# Patient Record
Sex: FEMALE | Race: WHITE | NOT HISPANIC OR LATINO
[De-identification: names, ages, dates, MRNs, and addresses within clinical notes are randomized per-mention and may not be internally consistent; named-entity substitution may affect disease eponyms.]

---

## 2017-01-03 ENCOUNTER — APPOINTMENT (OUTPATIENT)
Dept: HEART AND VASCULAR | Facility: CLINIC | Age: 67
End: 2017-01-03

## 2017-01-04 ENCOUNTER — APPOINTMENT (OUTPATIENT)
Dept: ORTHOPEDIC SURGERY | Facility: CLINIC | Age: 67
End: 2017-01-04

## 2017-01-06 ENCOUNTER — APPOINTMENT (OUTPATIENT)
Dept: ORTHOPEDIC SURGERY | Facility: CLINIC | Age: 67
End: 2017-01-06

## 2017-01-12 ENCOUNTER — APPOINTMENT (OUTPATIENT)
Dept: ORTHOPEDIC SURGERY | Facility: CLINIC | Age: 67
End: 2017-01-12

## 2017-01-17 ENCOUNTER — RX RENEWAL (OUTPATIENT)
Age: 67
End: 2017-01-17

## 2017-01-24 ENCOUNTER — APPOINTMENT (OUTPATIENT)
Dept: ORTHOPEDIC SURGERY | Facility: CLINIC | Age: 67
End: 2017-01-24

## 2017-01-24 DIAGNOSIS — L02.511 CUTANEOUS ABSCESS OF RIGHT HAND: ICD-10-CM
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
## 2017-01-24 DIAGNOSIS — S61.259D OPEN BITE OF UNSPECIFIED FINGER W/OUT DAMAGE TO NAIL, SUBSEQUENT ENCOUNTER: ICD-10-CM

## 2017-01-24 LAB
INR PPP: 3.78
PT BLD: 39.8

## 2017-01-27 ENCOUNTER — APPOINTMENT (OUTPATIENT)
Dept: ORTHOPEDIC SURGERY | Facility: CLINIC | Age: 67
End: 2017-01-27

## 2017-02-03 ENCOUNTER — APPOINTMENT (OUTPATIENT)
Dept: ORTHOPEDIC SURGERY | Facility: CLINIC | Age: 67
End: 2017-02-03

## 2017-02-10 ENCOUNTER — APPOINTMENT (OUTPATIENT)
Dept: ORTHOPEDIC SURGERY | Facility: CLINIC | Age: 67
End: 2017-02-10

## 2017-02-10 VITALS — HEIGHT: 65 IN | RESPIRATION RATE: 16 BRPM | WEIGHT: 150 LBS | BODY MASS INDEX: 24.99 KG/M2

## 2017-02-10 DIAGNOSIS — L98.0 PYOGENIC GRANULOMA: ICD-10-CM

## 2017-02-21 ENCOUNTER — APPOINTMENT (OUTPATIENT)
Dept: HEART AND VASCULAR | Facility: CLINIC | Age: 67
End: 2017-02-21

## 2017-02-21 ENCOUNTER — LABORATORY RESULT (OUTPATIENT)
Age: 67
End: 2017-02-21

## 2017-02-21 VITALS
BODY MASS INDEX: 23.49 KG/M2 | OXYGEN SATURATION: 98 % | TEMPERATURE: 98.2 F | HEIGHT: 68 IN | WEIGHT: 155 LBS | SYSTOLIC BLOOD PRESSURE: 148 MMHG | HEART RATE: 62 BPM | DIASTOLIC BLOOD PRESSURE: 80 MMHG

## 2017-02-22 ENCOUNTER — CLINICAL ADVICE (OUTPATIENT)
Age: 67
End: 2017-02-22

## 2017-02-22 LAB
ALBUMIN SERPL ELPH-MCNC: 3.7 G/DL
ALP BLD-CCNC: 75 U/L
ALT SERPL-CCNC: 8 U/L
ANION GAP SERPL CALC-SCNC: 17 MMOL/L
APPEARANCE: CLEAR
APTT BLD: 58.4 SEC
AST SERPL-CCNC: 12 U/L
BASOPHILS # BLD AUTO: 0.05 K/UL
BASOPHILS NFR BLD AUTO: 0.9 %
BILIRUB SERPL-MCNC: 0.3 MG/DL
BILIRUBIN URINE: NEGATIVE
BLOOD URINE: ABNORMAL
BUN SERPL-MCNC: 15 MG/DL
CALCIUM SERPL-MCNC: 9.7 MG/DL
CHLORIDE SERPL-SCNC: 104 MMOL/L
CHOLEST SERPL-MCNC: 177 MG/DL
CHOLEST/HDLC SERPL: 3.4 RATIO
CO2 SERPL-SCNC: 23 MMOL/L
COLOR: YELLOW
CREAT SERPL-MCNC: 0.87 MG/DL
EOSINOPHIL # BLD AUTO: 0.23 K/UL
EOSINOPHIL NFR BLD AUTO: 3.9 %
GLUCOSE QUALITATIVE U: NORMAL MG/DL
GLUCOSE SERPL-MCNC: 87 MG/DL
HBA1C MFR BLD HPLC: 5.4 %
HCT VFR BLD CALC: 37.3 %
HDLC SERPL-MCNC: 52 MG/DL
HGB BLD-MCNC: 11.8 G/DL
IMM GRANULOCYTES NFR BLD AUTO: 0.5 %
INR PPP: 4.88 RATIO
KETONES URINE: NEGATIVE
LDLC SERPL CALC-MCNC: 108 MG/DL
LEUKOCYTE ESTERASE URINE: NEGATIVE
LYMPHOCYTES # BLD AUTO: 1.4 K/UL
LYMPHOCYTES NFR BLD AUTO: 24 %
MAN DIFF?: NORMAL
MCHC RBC-ENTMCNC: 28.2 PG
MCHC RBC-ENTMCNC: 31.6 GM/DL
MCV RBC AUTO: 89.2 FL
MONOCYTES # BLD AUTO: 0.47 K/UL
MONOCYTES NFR BLD AUTO: 8.1 %
NEUTROPHILS # BLD AUTO: 3.65 K/UL
NEUTROPHILS NFR BLD AUTO: 62.6 %
NITRITE URINE: NEGATIVE
PH URINE: 6
PLATELET # BLD AUTO: 327 K/UL
POTASSIUM SERPL-SCNC: 3.1 MMOL/L
PROT SERPL-MCNC: 6.8 G/DL
PROTEIN URINE: NEGATIVE MG/DL
PT BLD: 56 SEC
RBC # BLD: 4.18 M/UL
RBC # FLD: 14.4 %
SODIUM SERPL-SCNC: 144 MMOL/L
SPECIFIC GRAVITY URINE: 1.02
TRIGL SERPL-MCNC: 87 MG/DL
TSH SERPL-ACNC: 1.69 UIU/ML
UROBILINOGEN URINE: NORMAL MG/DL
WBC # FLD AUTO: 5.83 K/UL

## 2017-03-07 ENCOUNTER — RX RENEWAL (OUTPATIENT)
Age: 67
End: 2017-03-07

## 2017-04-18 LAB
INR PPP: 4.85
PT BLD: 50.8

## 2017-06-02 ENCOUNTER — CLINICAL ADVICE (OUTPATIENT)
Age: 67
End: 2017-06-02

## 2017-06-02 LAB
INR PPP: 2.82
PT BLD: 29.8

## 2017-06-19 ENCOUNTER — RX RENEWAL (OUTPATIENT)
Age: 67
End: 2017-06-19

## 2017-07-14 ENCOUNTER — APPOINTMENT (OUTPATIENT)
Dept: HEART AND VASCULAR | Facility: CLINIC | Age: 67
End: 2017-07-14

## 2017-07-31 ENCOUNTER — CLINICAL ADVICE (OUTPATIENT)
Age: 67
End: 2017-07-31

## 2017-07-31 LAB
INR PPP: 2.56
PT BLD: 27.1

## 2017-08-01 ENCOUNTER — APPOINTMENT (OUTPATIENT)
Dept: HEART AND VASCULAR | Facility: CLINIC | Age: 67
End: 2017-08-01
Payer: COMMERCIAL

## 2017-08-01 VITALS
HEART RATE: 63 BPM | OXYGEN SATURATION: 98 % | BODY MASS INDEX: 23.19 KG/M2 | SYSTOLIC BLOOD PRESSURE: 146 MMHG | WEIGHT: 153 LBS | HEIGHT: 68 IN | TEMPERATURE: 98.5 F | DIASTOLIC BLOOD PRESSURE: 86 MMHG

## 2017-08-01 PROCEDURE — 93000 ELECTROCARDIOGRAM COMPLETE: CPT

## 2017-08-01 PROCEDURE — 99214 OFFICE O/P EST MOD 30 MIN: CPT | Mod: 25

## 2017-08-01 PROCEDURE — 36415 COLL VENOUS BLD VENIPUNCTURE: CPT

## 2017-08-02 ENCOUNTER — CLINICAL ADVICE (OUTPATIENT)
Age: 67
End: 2017-08-02

## 2017-08-02 LAB
ALBUMIN SERPL ELPH-MCNC: 4.3 G/DL
ALP BLD-CCNC: 83 U/L
ALT SERPL-CCNC: 23 U/L
ANION GAP SERPL CALC-SCNC: 14 MMOL/L
AST SERPL-CCNC: 26 U/L
BASOPHILS # BLD AUTO: 0.03 K/UL
BASOPHILS NFR BLD AUTO: 0.5 %
BILIRUB SERPL-MCNC: 0.7 MG/DL
BUN SERPL-MCNC: 18 MG/DL
CALCIUM SERPL-MCNC: 10 MG/DL
CHLORIDE SERPL-SCNC: 106 MMOL/L
CHOLEST SERPL-MCNC: 216 MG/DL
CHOLEST/HDLC SERPL: 2.7 RATIO
CO2 SERPL-SCNC: 23 MMOL/L
CREAT SERPL-MCNC: 0.95 MG/DL
EOSINOPHIL # BLD AUTO: 0.14 K/UL
EOSINOPHIL NFR BLD AUTO: 2.3 %
GLUCOSE SERPL-MCNC: 86 MG/DL
HBA1C MFR BLD HPLC: 5.1 %
HCT VFR BLD CALC: 37.6 %
HDLC SERPL-MCNC: 79 MG/DL
HGB BLD-MCNC: 11.9 G/DL
IMM GRANULOCYTES NFR BLD AUTO: 0.3 %
INR PPP: 3 RATIO
LDLC SERPL CALC-MCNC: 122 MG/DL
LYMPHOCYTES # BLD AUTO: 1.58 K/UL
LYMPHOCYTES NFR BLD AUTO: 25.8 %
MAN DIFF?: NORMAL
MCHC RBC-ENTMCNC: 27.9 PG
MCHC RBC-ENTMCNC: 31.6 GM/DL
MCV RBC AUTO: 88.1 FL
MONOCYTES # BLD AUTO: 0.66 K/UL
MONOCYTES NFR BLD AUTO: 10.8 %
NEUTROPHILS # BLD AUTO: 3.7 K/UL
NEUTROPHILS NFR BLD AUTO: 60.3 %
PLATELET # BLD AUTO: 237 K/UL
POTASSIUM SERPL-SCNC: 4.2 MMOL/L
PROT SERPL-MCNC: 7.3 G/DL
PT BLD: 34.7 SEC
RBC # BLD: 4.27 M/UL
RBC # FLD: 15.1 %
SODIUM SERPL-SCNC: 143 MMOL/L
TRIGL SERPL-MCNC: 73 MG/DL
TSH SERPL-ACNC: 2.48 UIU/ML
WBC # FLD AUTO: 6.13 K/UL

## 2017-08-21 ENCOUNTER — RX RENEWAL (OUTPATIENT)
Age: 67
End: 2017-08-21

## 2017-09-20 ENCOUNTER — RX RENEWAL (OUTPATIENT)
Age: 67
End: 2017-09-20

## 2017-09-27 ENCOUNTER — CLINICAL ADVICE (OUTPATIENT)
Age: 67
End: 2017-09-27

## 2017-11-16 ENCOUNTER — RX RENEWAL (OUTPATIENT)
Age: 67
End: 2017-11-16

## 2017-11-17 ENCOUNTER — CLINICAL ADVICE (OUTPATIENT)
Age: 67
End: 2017-11-17

## 2017-11-17 LAB
INR PPP: 2.69
PT BLD: 28.5

## 2017-11-27 ENCOUNTER — RX RENEWAL (OUTPATIENT)
Age: 67
End: 2017-11-27

## 2017-12-15 ENCOUNTER — APPOINTMENT (OUTPATIENT)
Dept: HEART AND VASCULAR | Facility: CLINIC | Age: 67
End: 2017-12-15
Payer: COMMERCIAL

## 2017-12-15 VITALS — OXYGEN SATURATION: 99 % | HEIGHT: 68 IN | TEMPERATURE: 98.2 F | HEART RATE: 60 BPM

## 2017-12-15 VITALS — DIASTOLIC BLOOD PRESSURE: 80 MMHG | SYSTOLIC BLOOD PRESSURE: 140 MMHG

## 2017-12-15 PROCEDURE — 93000 ELECTROCARDIOGRAM COMPLETE: CPT

## 2017-12-15 PROCEDURE — 90471 IMMUNIZATION ADMIN: CPT

## 2017-12-15 PROCEDURE — 36415 COLL VENOUS BLD VENIPUNCTURE: CPT

## 2017-12-15 PROCEDURE — 99214 OFFICE O/P EST MOD 30 MIN: CPT | Mod: 25

## 2017-12-15 PROCEDURE — 93306 TTE W/DOPPLER COMPLETE: CPT | Mod: XE

## 2017-12-15 PROCEDURE — 90662 IIV NO PRSV INCREASED AG IM: CPT

## 2017-12-15 PROCEDURE — 90472 IMMUNIZATION ADMIN EACH ADD: CPT

## 2017-12-15 PROCEDURE — 90732 PPSV23 VACC 2 YRS+ SUBQ/IM: CPT

## 2017-12-15 RX ORDER — CEPHALEXIN 500 MG/1
500 CAPSULE ORAL
Qty: 24 | Refills: 0 | Status: DISCONTINUED | COMMUNITY
Start: 2016-10-26 | End: 2017-12-15

## 2017-12-15 RX ORDER — TETANUS TOXOID, REDUCED DIPHTHERIA TOXOID AND ACELLULAR PERTUSSIS VACCINE, ADSORBED 5; 2.5; 8; 8; 2.5 [IU]/.5ML; [IU]/.5ML; UG/.5ML; UG/.5ML; UG/.5ML
5-2.5-18.5 SUSPENSION INTRAMUSCULAR
Qty: 1 | Refills: 0 | Status: DISCONTINUED | COMMUNITY
Start: 2016-10-26 | End: 2017-12-15

## 2017-12-15 RX ORDER — WARFARIN 7.5 MG/1
7.5 TABLET ORAL
Qty: 85 | Refills: 0 | Status: DISCONTINUED | COMMUNITY
Start: 2016-01-04 | End: 2017-12-15

## 2017-12-15 RX ORDER — AMOXICILLIN AND CLAVULANATE POTASSIUM 500; 125 MG/1; MG/1
500-125 TABLET, FILM COATED ORAL
Qty: 14 | Refills: 0 | Status: DISCONTINUED | COMMUNITY
Start: 2016-10-15 | End: 2017-12-15

## 2017-12-18 LAB
ALBUMIN SERPL ELPH-MCNC: 4.2 G/DL
ALP BLD-CCNC: 73 U/L
ALT SERPL-CCNC: 16 U/L
ANION GAP SERPL CALC-SCNC: 15 MMOL/L
APTT BLD: 49.7 SEC
AST SERPL-CCNC: 25 U/L
BASOPHILS # BLD AUTO: 0.05 K/UL
BASOPHILS NFR BLD AUTO: 0.9 %
BILIRUB SERPL-MCNC: 0.5 MG/DL
BUN SERPL-MCNC: 17 MG/DL
CALCIUM SERPL-MCNC: 9.8 MG/DL
CHLORIDE SERPL-SCNC: 105 MMOL/L
CHOLEST SERPL-MCNC: 195 MG/DL
CHOLEST/HDLC SERPL: 2.4 RATIO
CO2 SERPL-SCNC: 23 MMOL/L
CREAT SERPL-MCNC: 0.84 MG/DL
EOSINOPHIL # BLD AUTO: 0.16 K/UL
EOSINOPHIL NFR BLD AUTO: 3 %
GLUCOSE SERPL-MCNC: 90 MG/DL
HBA1C MFR BLD HPLC: 5.2 %
HCT VFR BLD CALC: 38.2 %
HDLC SERPL-MCNC: 81 MG/DL
HGB BLD-MCNC: 12 G/DL
IMM GRANULOCYTES NFR BLD AUTO: 0.2 %
INR PPP: 2.91 RATIO
LDLC SERPL CALC-MCNC: 104 MG/DL
LYMPHOCYTES # BLD AUTO: 1.29 K/UL
LYMPHOCYTES NFR BLD AUTO: 24 %
MAN DIFF?: NORMAL
MCHC RBC-ENTMCNC: 28.1 PG
MCHC RBC-ENTMCNC: 31.4 GM/DL
MCV RBC AUTO: 89.5 FL
MONOCYTES # BLD AUTO: 0.57 K/UL
MONOCYTES NFR BLD AUTO: 10.6 %
NEUTROPHILS # BLD AUTO: 3.3 K/UL
NEUTROPHILS NFR BLD AUTO: 61.3 %
PLATELET # BLD AUTO: 223 K/UL
POTASSIUM SERPL-SCNC: 4.1 MMOL/L
PROT SERPL-MCNC: 6.8 G/DL
PT BLD: 33.6 SEC
RBC # BLD: 4.27 M/UL
RBC # FLD: 15 %
SODIUM SERPL-SCNC: 143 MMOL/L
TRIGL SERPL-MCNC: 50 MG/DL
TSH SERPL-ACNC: 1.9 UIU/ML
WBC # FLD AUTO: 5.38 K/UL

## 2018-01-05 ENCOUNTER — RX RENEWAL (OUTPATIENT)
Age: 68
End: 2018-01-05

## 2018-01-24 ENCOUNTER — MOBILE ON CALL (OUTPATIENT)
Age: 68
End: 2018-01-24

## 2018-01-24 LAB
INR PPP: 4.42
PT BLD: 44.3

## 2018-02-02 ENCOUNTER — RX RENEWAL (OUTPATIENT)
Age: 68
End: 2018-02-02

## 2018-02-20 ENCOUNTER — RX RENEWAL (OUTPATIENT)
Age: 68
End: 2018-02-20

## 2018-02-28 ENCOUNTER — RX RENEWAL (OUTPATIENT)
Age: 68
End: 2018-02-28

## 2018-03-05 ENCOUNTER — RX RENEWAL (OUTPATIENT)
Age: 68
End: 2018-03-05

## 2018-03-19 ENCOUNTER — RX RENEWAL (OUTPATIENT)
Age: 68
End: 2018-03-19

## 2018-04-06 ENCOUNTER — LABORATORY RESULT (OUTPATIENT)
Age: 68
End: 2018-04-06

## 2018-04-06 ENCOUNTER — APPOINTMENT (OUTPATIENT)
Dept: HEART AND VASCULAR | Facility: CLINIC | Age: 68
End: 2018-04-06
Payer: COMMERCIAL

## 2018-04-06 VITALS
WEIGHT: 156 LBS | HEIGHT: 68 IN | OXYGEN SATURATION: 99 % | DIASTOLIC BLOOD PRESSURE: 86 MMHG | TEMPERATURE: 97.7 F | HEART RATE: 63 BPM | SYSTOLIC BLOOD PRESSURE: 148 MMHG | BODY MASS INDEX: 23.64 KG/M2

## 2018-04-06 PROCEDURE — 36415 COLL VENOUS BLD VENIPUNCTURE: CPT

## 2018-04-06 PROCEDURE — 93000 ELECTROCARDIOGRAM COMPLETE: CPT

## 2018-04-06 PROCEDURE — 99214 OFFICE O/P EST MOD 30 MIN: CPT | Mod: 25

## 2018-04-09 ENCOUNTER — RX RENEWAL (OUTPATIENT)
Age: 68
End: 2018-04-09

## 2018-04-09 LAB
ALBUMIN SERPL ELPH-MCNC: 4.4 G/DL
ALP BLD-CCNC: 87 U/L
ALT SERPL-CCNC: 8 U/L
ANION GAP SERPL CALC-SCNC: 13 MMOL/L
APPEARANCE: CLEAR
APTT BLD: 44.7 SEC
AST SERPL-CCNC: 15 U/L
BASOPHILS # BLD AUTO: 0.05 K/UL
BASOPHILS NFR BLD AUTO: 0.8 %
BILIRUB SERPL-MCNC: 0.6 MG/DL
BILIRUBIN URINE: NEGATIVE
BLOOD URINE: ABNORMAL
BUN SERPL-MCNC: 15 MG/DL
CALCIUM SERPL-MCNC: 10 MG/DL
CHLORIDE SERPL-SCNC: 103 MMOL/L
CHOLEST SERPL-MCNC: 218 MG/DL
CHOLEST/HDLC SERPL: 2.7 RATIO
CO2 SERPL-SCNC: 25 MMOL/L
COLOR: YELLOW
CREAT SERPL-MCNC: 0.94 MG/DL
EOSINOPHIL # BLD AUTO: 0.12 K/UL
EOSINOPHIL NFR BLD AUTO: 1.9 %
GLUCOSE QUALITATIVE U: NEGATIVE MG/DL
GLUCOSE SERPL-MCNC: 82 MG/DL
HBA1C MFR BLD HPLC: 5.5 %
HCT VFR BLD CALC: 38.6 %
HDLC SERPL-MCNC: 81 MG/DL
HGB BLD-MCNC: 12.2 G/DL
IMM GRANULOCYTES NFR BLD AUTO: 0.2 %
INR PPP: 2.17 RATIO
KETONES URINE: ABNORMAL
LDLC SERPL CALC-MCNC: 125 MG/DL
LEUKOCYTE ESTERASE URINE: NEGATIVE
LYMPHOCYTES # BLD AUTO: 1.37 K/UL
LYMPHOCYTES NFR BLD AUTO: 21.8 %
MAN DIFF?: NORMAL
MCHC RBC-ENTMCNC: 28.8 PG
MCHC RBC-ENTMCNC: 31.6 GM/DL
MCV RBC AUTO: 91.3 FL
MONOCYTES # BLD AUTO: 0.52 K/UL
MONOCYTES NFR BLD AUTO: 8.3 %
NEUTROPHILS # BLD AUTO: 4.21 K/UL
NEUTROPHILS NFR BLD AUTO: 67 %
NITRITE URINE: NEGATIVE
PH URINE: 6.5
PLATELET # BLD AUTO: 226 K/UL
POTASSIUM SERPL-SCNC: 4.5 MMOL/L
PROT SERPL-MCNC: 7.2 G/DL
PROTEIN URINE: NEGATIVE MG/DL
PT BLD: 24.9 SEC
RBC # BLD: 4.23 M/UL
RBC # FLD: 15.7 %
SODIUM SERPL-SCNC: 141 MMOL/L
SPECIFIC GRAVITY URINE: 1.02
TRIGL SERPL-MCNC: 61 MG/DL
TSH SERPL-ACNC: 2.19 UIU/ML
UROBILINOGEN URINE: NEGATIVE MG/DL
WBC # FLD AUTO: 6.28 K/UL

## 2018-04-17 ENCOUNTER — RX RENEWAL (OUTPATIENT)
Age: 68
End: 2018-04-17

## 2018-05-01 ENCOUNTER — EMERGENCY (EMERGENCY)
Facility: HOSPITAL | Age: 68
LOS: 1 days | Discharge: HOME | End: 2018-05-01
Attending: EMERGENCY MEDICINE
Payer: COMMERCIAL

## 2018-05-01 VITALS
HEIGHT: 65 IN | RESPIRATION RATE: 20 BRPM | WEIGHT: 154.98 LBS | DIASTOLIC BLOOD PRESSURE: 85 MMHG | TEMPERATURE: 98 F | HEART RATE: 66 BPM | OXYGEN SATURATION: 98 % | SYSTOLIC BLOOD PRESSURE: 146 MMHG

## 2018-05-01 VITALS
SYSTOLIC BLOOD PRESSURE: 140 MMHG | OXYGEN SATURATION: 99 % | DIASTOLIC BLOOD PRESSURE: 68 MMHG | TEMPERATURE: 97 F | RESPIRATION RATE: 20 BRPM | HEART RATE: 62 BPM

## 2018-05-01 LAB
APTT BLD: 54.9 SEC — HIGH (ref 27.5–37.4)
INR BLD: 3.54 RATIO — HIGH (ref 0.88–1.16)
PROTHROM AB SERPL-ACNC: 39.6 SEC — HIGH (ref 9.8–12.7)

## 2018-05-01 PROCEDURE — 85730 THROMBOPLASTIN TIME PARTIAL: CPT

## 2018-05-01 PROCEDURE — 73562 X-RAY EXAM OF KNEE 3: CPT | Mod: 26,RT

## 2018-05-01 PROCEDURE — 99284 EMERGENCY DEPT VISIT MOD MDM: CPT | Mod: 25

## 2018-05-01 PROCEDURE — 85610 PROTHROMBIN TIME: CPT

## 2018-05-01 PROCEDURE — 73562 X-RAY EXAM OF KNEE 3: CPT

## 2018-05-01 PROCEDURE — 99283 EMERGENCY DEPT VISIT LOW MDM: CPT | Mod: 25

## 2018-05-01 NOTE — ED ADULT TRIAGE NOTE - CHIEF COMPLAINT QUOTE
pt stated she tripped on the side walk going to the store, denies hitting head c/o pain to right knee

## 2018-05-01 NOTE — ED PROVIDER NOTE - PHYSICAL EXAMINATION
Right anterior knee eccymosis and tenderness. Anterior, posterior drawer test and Vulgus/Varus test are negative. No bony deformities, +knee flexion and extension intact, cap refill < 2 secs, pedal pulses intact.

## 2018-05-01 NOTE — ED PROVIDER NOTE - OBJECTIVE STATEMENT
Pt tripped on side walk at 3pm yesterday, landed on right knee. No head trauma, no LOC. Pt is on coumadin 7.5mg for hx of mitral valve replacement.

## 2018-05-09 ENCOUNTER — RX RENEWAL (OUTPATIENT)
Age: 68
End: 2018-05-09

## 2018-05-29 ENCOUNTER — RX RENEWAL (OUTPATIENT)
Age: 68
End: 2018-05-29

## 2018-06-11 ENCOUNTER — RX RENEWAL (OUTPATIENT)
Age: 68
End: 2018-06-11

## 2018-07-11 ENCOUNTER — RX RENEWAL (OUTPATIENT)
Age: 68
End: 2018-07-11

## 2018-07-13 ENCOUNTER — RX RENEWAL (OUTPATIENT)
Age: 68
End: 2018-07-13

## 2018-07-20 ENCOUNTER — APPOINTMENT (OUTPATIENT)
Dept: HEART AND VASCULAR | Facility: CLINIC | Age: 68
End: 2018-07-20
Payer: COMMERCIAL

## 2018-07-20 VITALS
WEIGHT: 156 LBS | TEMPERATURE: 97.7 F | HEART RATE: 67 BPM | RESPIRATION RATE: 14 BRPM | SYSTOLIC BLOOD PRESSURE: 127 MMHG | DIASTOLIC BLOOD PRESSURE: 80 MMHG | OXYGEN SATURATION: 97 % | BODY MASS INDEX: 23.64 KG/M2 | HEIGHT: 68 IN

## 2018-07-20 LAB
INR PPP: 5.5
PT BLD: 66.5

## 2018-07-20 PROCEDURE — 99214 OFFICE O/P EST MOD 30 MIN: CPT

## 2018-08-08 ENCOUNTER — RX RENEWAL (OUTPATIENT)
Age: 68
End: 2018-08-08

## 2018-08-17 ENCOUNTER — RX RENEWAL (OUTPATIENT)
Age: 68
End: 2018-08-17

## 2018-08-31 ENCOUNTER — OTHER (OUTPATIENT)
Age: 68
End: 2018-08-31

## 2018-08-31 LAB
INR PPP: 2.37
PT BLD: 24.6

## 2018-09-19 ENCOUNTER — LABORATORY RESULT (OUTPATIENT)
Age: 68
End: 2018-09-19

## 2018-09-19 ENCOUNTER — APPOINTMENT (OUTPATIENT)
Dept: HEART AND VASCULAR | Facility: CLINIC | Age: 68
End: 2018-09-19
Payer: COMMERCIAL

## 2018-09-19 ENCOUNTER — INPATIENT (INPATIENT)
Facility: HOSPITAL | Age: 68
LOS: 2 days | Discharge: HOME | DRG: 310 | End: 2018-09-22
Attending: INTERNAL MEDICINE | Admitting: INTERNAL MEDICINE
Payer: COMMERCIAL

## 2018-09-19 VITALS
DIASTOLIC BLOOD PRESSURE: 90 MMHG | WEIGHT: 158 LBS | OXYGEN SATURATION: 96 % | HEIGHT: 65 IN | TEMPERATURE: 98.5 F | BODY MASS INDEX: 26.33 KG/M2 | HEART RATE: 154 BPM | SYSTOLIC BLOOD PRESSURE: 130 MMHG

## 2018-09-19 VITALS
TEMPERATURE: 98 F | DIASTOLIC BLOOD PRESSURE: 86 MMHG | OXYGEN SATURATION: 100 % | WEIGHT: 159.17 LBS | HEIGHT: 64 IN | RESPIRATION RATE: 18 BRPM | HEART RATE: 150 BPM | SYSTOLIC BLOOD PRESSURE: 134 MMHG

## 2018-09-19 DIAGNOSIS — I10 ESSENTIAL (PRIMARY) HYPERTENSION: ICD-10-CM

## 2018-09-19 DIAGNOSIS — Z95.2 PRESENCE OF PROSTHETIC HEART VALVE: Chronic | ICD-10-CM

## 2018-09-19 DIAGNOSIS — I48.92 UNSPECIFIED ATRIAL FLUTTER: ICD-10-CM

## 2018-09-19 DIAGNOSIS — Z95.2 PRESENCE OF PROSTHETIC HEART VALVE: ICD-10-CM

## 2018-09-19 LAB
ALBUMIN SERPL ELPH-MCNC: 4.4 G/DL — SIGNIFICANT CHANGE UP (ref 3.3–5)
ALP SERPL-CCNC: 74 U/L — SIGNIFICANT CHANGE UP (ref 40–120)
ALT FLD-CCNC: 30 U/L — SIGNIFICANT CHANGE UP (ref 10–45)
ANION GAP SERPL CALC-SCNC: 13 MMOL/L — SIGNIFICANT CHANGE UP (ref 5–17)
APTT BLD: 68.6 SEC — HIGH (ref 27.5–37.4)
AST SERPL-CCNC: 32 U/L — SIGNIFICANT CHANGE UP (ref 10–40)
BASOPHILS NFR BLD AUTO: 0.5 % — SIGNIFICANT CHANGE UP (ref 0–2)
BILIRUB SERPL-MCNC: 0.7 MG/DL — SIGNIFICANT CHANGE UP (ref 0.2–1.2)
BUN SERPL-MCNC: 21 MG/DL — SIGNIFICANT CHANGE UP (ref 7–23)
CALCIUM SERPL-MCNC: 10.3 MG/DL — SIGNIFICANT CHANGE UP (ref 8.4–10.5)
CHLORIDE SERPL-SCNC: 104 MMOL/L — SIGNIFICANT CHANGE UP (ref 96–108)
CK MB CFR SERPL CALC: 3.6 NG/ML — SIGNIFICANT CHANGE UP (ref 0–6.7)
CK SERPL-CCNC: 185 U/L — HIGH (ref 25–170)
CO2 SERPL-SCNC: 23 MMOL/L — SIGNIFICANT CHANGE UP (ref 22–31)
CREAT SERPL-MCNC: 0.96 MG/DL — SIGNIFICANT CHANGE UP (ref 0.5–1.3)
EOSINOPHIL NFR BLD AUTO: 1.9 % — SIGNIFICANT CHANGE UP (ref 0–6)
GLUCOSE SERPL-MCNC: 100 MG/DL — HIGH (ref 70–99)
HCT VFR BLD CALC: 38.3 % — SIGNIFICANT CHANGE UP (ref 34.5–45)
HGB BLD-MCNC: 12.6 G/DL — SIGNIFICANT CHANGE UP (ref 11.5–15.5)
INR BLD: 7.72 — CRITICAL HIGH (ref 0.88–1.16)
LYMPHOCYTES # BLD AUTO: 16.7 % — SIGNIFICANT CHANGE UP (ref 13–44)
MCHC RBC-ENTMCNC: 28.6 PG — SIGNIFICANT CHANGE UP (ref 27–34)
MCHC RBC-ENTMCNC: 32.9 G/DL — SIGNIFICANT CHANGE UP (ref 32–36)
MCV RBC AUTO: 86.8 FL — SIGNIFICANT CHANGE UP (ref 80–100)
MONOCYTES NFR BLD AUTO: 12.2 % — SIGNIFICANT CHANGE UP (ref 2–14)
NEUTROPHILS NFR BLD AUTO: 68.7 % — SIGNIFICANT CHANGE UP (ref 43–77)
PLATELET # BLD AUTO: 243 K/UL — SIGNIFICANT CHANGE UP (ref 150–400)
POTASSIUM SERPL-MCNC: 3.9 MMOL/L — SIGNIFICANT CHANGE UP (ref 3.5–5.3)
POTASSIUM SERPL-SCNC: 3.9 MMOL/L — SIGNIFICANT CHANGE UP (ref 3.5–5.3)
PROT SERPL-MCNC: 7.7 G/DL — SIGNIFICANT CHANGE UP (ref 6–8.3)
PROTHROM AB SERPL-ACNC: 89.3 SEC — HIGH (ref 9.8–12.7)
RBC # BLD: 4.41 M/UL — SIGNIFICANT CHANGE UP (ref 3.8–5.2)
RBC # FLD: 15 % — SIGNIFICANT CHANGE UP (ref 10.3–16.9)
SODIUM SERPL-SCNC: 140 MMOL/L — SIGNIFICANT CHANGE UP (ref 135–145)
TROPONIN T SERPL-MCNC: <0.01 NG/ML — SIGNIFICANT CHANGE UP (ref 0–0.01)
WBC # BLD: 8 K/UL — SIGNIFICANT CHANGE UP (ref 3.8–10.5)
WBC # FLD AUTO: 8 K/UL — SIGNIFICANT CHANGE UP (ref 3.8–10.5)

## 2018-09-19 PROCEDURE — 71045 X-RAY EXAM CHEST 1 VIEW: CPT | Mod: 26

## 2018-09-19 PROCEDURE — 93000 ELECTROCARDIOGRAM COMPLETE: CPT

## 2018-09-19 PROCEDURE — 36415 COLL VENOUS BLD VENIPUNCTURE: CPT

## 2018-09-19 PROCEDURE — 99215 OFFICE O/P EST HI 40 MIN: CPT

## 2018-09-19 PROCEDURE — 93010 ELECTROCARDIOGRAM REPORT: CPT

## 2018-09-19 PROCEDURE — 99223 1ST HOSP IP/OBS HIGH 75: CPT

## 2018-09-19 PROCEDURE — 99291 CRITICAL CARE FIRST HOUR: CPT

## 2018-09-19 RX ORDER — METOPROLOL TARTRATE 50 MG
25 TABLET ORAL ONCE
Qty: 0 | Refills: 0 | Status: COMPLETED | OUTPATIENT
Start: 2018-09-19 | End: 2018-09-19

## 2018-09-19 RX ORDER — ONDANSETRON 8 MG/1
4 TABLET, FILM COATED ORAL ONCE
Qty: 0 | Refills: 0 | Status: DISCONTINUED | OUTPATIENT
Start: 2018-09-19 | End: 2018-09-21

## 2018-09-19 RX ORDER — METOPROLOL TARTRATE 50 MG
5 TABLET ORAL ONCE
Qty: 0 | Refills: 0 | Status: COMPLETED | OUTPATIENT
Start: 2018-09-19 | End: 2018-09-19

## 2018-09-19 RX ORDER — METOPROLOL TARTRATE 50 MG
25 TABLET ORAL
Qty: 0 | Refills: 0 | Status: DISCONTINUED | OUTPATIENT
Start: 2018-09-19 | End: 2018-09-19

## 2018-09-19 RX ORDER — METOPROLOL TARTRATE 50 MG
25 TABLET ORAL
Qty: 0 | Refills: 0 | Status: DISCONTINUED | OUTPATIENT
Start: 2018-09-19 | End: 2018-09-20

## 2018-09-19 RX ORDER — LOSARTAN POTASSIUM 100 MG/1
100 TABLET, FILM COATED ORAL DAILY
Qty: 0 | Refills: 0 | Status: DISCONTINUED | OUTPATIENT
Start: 2018-09-19 | End: 2018-09-22

## 2018-09-19 RX ORDER — SODIUM CHLORIDE 9 MG/ML
1000 INJECTION INTRAMUSCULAR; INTRAVENOUS; SUBCUTANEOUS ONCE
Qty: 0 | Refills: 0 | Status: COMPLETED | OUTPATIENT
Start: 2018-09-19 | End: 2018-09-19

## 2018-09-19 RX ADMIN — Medication 5 MILLIGRAM(S): at 16:20

## 2018-09-19 RX ADMIN — Medication 25 MILLIGRAM(S): at 16:46

## 2018-09-19 RX ADMIN — Medication 5 MILLIGRAM(S): at 16:13

## 2018-09-19 RX ADMIN — Medication 25 MILLIGRAM(S): at 20:22

## 2018-09-19 RX ADMIN — Medication 5 MILLIGRAM(S): at 16:41

## 2018-09-19 RX ADMIN — SODIUM CHLORIDE 2000 MILLILITER(S): 9 INJECTION INTRAMUSCULAR; INTRAVENOUS; SUBCUTANEOUS at 16:20

## 2018-09-19 NOTE — ED PROVIDER NOTE - MEDICAL DECISION MAKING DETAILS
new onset aflutter/ fib with RVR.  hemodynamically stable.  ecg with flutter/ rapid rate.  given metoprolol 5mg ivx2 with decrease in rate/ change to afib.  EP consulted.  plan admit for further treatment

## 2018-09-19 NOTE — H&P ADULT - HISTORY OF PRESENT ILLNESS
68yo female with PMH of HTN, Mitral valve replacement, and 66yo female with PMH of HTN, mechanical mitral valve (Dr. Thacker, 17 years ago), nephrolithiasis who presents from Dr. Michaels's office with newly diagnosed atrial flutter with RVR.  Reports onset of intermittent palpitations one week ago, becoming persistent over the last two days.  She denies associated CP, SOB/LOAIZA, dizziness, presyncope/syncope.  Reports compliant with Coumadin at home, no interruption in therapy.  Denies any bleeding issues.      In ED, Metoprolol IVP 5 mg x2 with improvement in HR to ~ 110 bpm 66yo female with PMH of HTN, mechanical mitral valve (Dr. Thacker, 17 years ago), nephrolithiasis who presents from Dr. Michaels's office with newly diagnosed atrial flutter with RVR.  Reports onset of intermittent palpitations one week ago, becoming persistent over the last two days.  She denies associated CP, SOB/LOAIZA, dizziness, presyncope/syncope.  Reports compliant with Coumadin at home, no interruption in therapy.  Denies any bleeding issues.      In ED, Metoprolol IVP 5 mg x2 with improvement in HR to ~ 110 bpm    ECHO 12/2017 LVEF 60%

## 2018-09-19 NOTE — ED ADULT TRIAGE NOTE - CHIEF COMPLAINT QUOTE
demetrius for check up of her "metal valve" and found to have a flutter and sent here - does feels palpitations

## 2018-09-19 NOTE — ED PROVIDER NOTE - CARE PLAN
Principal Discharge DX:	Atrial flutter with rapid ventricular response Principal Discharge DX:	Atrial flutter with rapid ventricular response  Secondary Diagnosis:	Elevated INR

## 2018-09-19 NOTE — ED PROVIDER NOTE - OBJECTIVE STATEMENT
history of htn, valve replacement, here from cardiologist office with [facility].  Reports intermittent palpitations/ racing heart for past week.  Mild intermittent lightheadedness.  No chest pain, sob, fever/chills.  Notes she is under a lot of stress lately.  On coumadin for valve replacement and compliant

## 2018-09-19 NOTE — H&P ADULT - ASSESSMENT
66 y/o F with HTN, s/p MV replacement (17 y. ago) and newly diagnosed atypical atrial flutter with RVR

## 2018-09-19 NOTE — ED ADULT NURSE NOTE - OBJECTIVE STATEMENT
Pt found to be in rapid a-fib at doctor's office. Pt upgraded to Dr Arana, placed on cardiac monitor, EKG done. Pt reports Pt found to be in rapid a-fib at doctor's office. Pt upgraded to Dr Arana, placed on cardiac monitor, EKG done. Pt reports occasional CP and palpitations. Denies SOB or dizziness. Pt on coumadin for mitral valve replacement.

## 2018-09-20 DIAGNOSIS — R79.1 ABNORMAL COAGULATION PROFILE: ICD-10-CM

## 2018-09-20 LAB
ALBUMIN SERPL ELPH-MCNC: 3.3 G/DL — SIGNIFICANT CHANGE UP (ref 3.3–5)
ALBUMIN SERPL ELPH-MCNC: 4.1 G/DL
ALP BLD-CCNC: 76 U/L
ALP SERPL-CCNC: 56 U/L — SIGNIFICANT CHANGE UP (ref 40–120)
ALT FLD-CCNC: 23 U/L — SIGNIFICANT CHANGE UP (ref 10–45)
ALT SERPL-CCNC: 30 U/L
ANION GAP SERPL CALC-SCNC: 11 MMOL/L — SIGNIFICANT CHANGE UP (ref 5–17)
ANION GAP SERPL CALC-SCNC: 13 MMOL/L
ANION GAP SERPL CALC-SCNC: 13 MMOL/L — SIGNIFICANT CHANGE UP (ref 5–17)
APPEARANCE: CLEAR
APTT BLD: 67.2 SEC — HIGH (ref 27.5–37.4)
APTT BLD: 68.1 SEC
AST SERPL-CCNC: 18 U/L — SIGNIFICANT CHANGE UP (ref 10–40)
AST SERPL-CCNC: 34 U/L
BASOPHILS # BLD AUTO: 0.03 K/UL
BASOPHILS NFR BLD AUTO: 0.2 % — SIGNIFICANT CHANGE UP (ref 0–2)
BASOPHILS NFR BLD AUTO: 0.4 %
BILIRUB SERPL-MCNC: 0.4 MG/DL
BILIRUB SERPL-MCNC: 0.6 MG/DL — SIGNIFICANT CHANGE UP (ref 0.2–1.2)
BILIRUBIN URINE: NEGATIVE
BLOOD URINE: ABNORMAL
BUN SERPL-MCNC: 15 MG/DL — SIGNIFICANT CHANGE UP (ref 7–23)
BUN SERPL-MCNC: 16 MG/DL — SIGNIFICANT CHANGE UP (ref 7–23)
BUN SERPL-MCNC: 21 MG/DL
CALCIUM SERPL-MCNC: 9.1 MG/DL — SIGNIFICANT CHANGE UP (ref 8.4–10.5)
CALCIUM SERPL-MCNC: 9.4 MG/DL — SIGNIFICANT CHANGE UP (ref 8.4–10.5)
CALCIUM SERPL-MCNC: 9.8 MG/DL
CHLORIDE SERPL-SCNC: 104 MMOL/L — SIGNIFICANT CHANGE UP (ref 96–108)
CHLORIDE SERPL-SCNC: 105 MMOL/L
CHLORIDE SERPL-SCNC: 106 MMOL/L — SIGNIFICANT CHANGE UP (ref 96–108)
CHOLEST SERPL-MCNC: 169 MG/DL
CHOLEST/HDLC SERPL: 2.7 RATIO
CO2 SERPL-SCNC: 23 MMOL/L
CO2 SERPL-SCNC: 23 MMOL/L — SIGNIFICANT CHANGE UP (ref 22–31)
CO2 SERPL-SCNC: 24 MMOL/L — SIGNIFICANT CHANGE UP (ref 22–31)
COLOR: YELLOW
CREAT SERPL-MCNC: 0.89 MG/DL
CREAT SERPL-MCNC: 0.91 MG/DL — SIGNIFICANT CHANGE UP (ref 0.5–1.3)
CREAT SERPL-MCNC: 0.91 MG/DL — SIGNIFICANT CHANGE UP (ref 0.5–1.3)
EOSINOPHIL # BLD AUTO: 0.16 K/UL
EOSINOPHIL NFR BLD AUTO: 1.1 % — SIGNIFICANT CHANGE UP (ref 0–6)
EOSINOPHIL NFR BLD AUTO: 2.2 %
GLUCOSE QUALITATIVE U: NEGATIVE MG/DL
GLUCOSE SERPL-MCNC: 86 MG/DL — SIGNIFICANT CHANGE UP (ref 70–99)
GLUCOSE SERPL-MCNC: 87 MG/DL — SIGNIFICANT CHANGE UP (ref 70–99)
GLUCOSE SERPL-MCNC: 90 MG/DL
HBA1C MFR BLD HPLC: 5.3 %
HCT VFR BLD CALC: 37 %
HCT VFR BLD CALC: 37.3 % — SIGNIFICANT CHANGE UP (ref 34.5–45)
HDLC SERPL-MCNC: 63 MG/DL
HGB BLD-MCNC: 11.7 G/DL
HGB BLD-MCNC: 11.7 G/DL — SIGNIFICANT CHANGE UP (ref 11.5–15.5)
IMM GRANULOCYTES NFR BLD AUTO: 0.1 %
INR BLD: 7.93 — CRITICAL HIGH (ref 0.88–1.16)
INR PPP: 6.71 RATIO
KETONES URINE: ABNORMAL
LDLC SERPL CALC-MCNC: 94 MG/DL
LEUKOCYTE ESTERASE URINE: NEGATIVE
LYMPHOCYTES # BLD AUTO: 1.52 K/UL
LYMPHOCYTES # BLD AUTO: 12.4 % — LOW (ref 13–44)
LYMPHOCYTES NFR BLD AUTO: 20.8 %
MAN DIFF?: NORMAL
MCHC RBC-ENTMCNC: 28 PG — SIGNIFICANT CHANGE UP (ref 27–34)
MCHC RBC-ENTMCNC: 28.3 PG
MCHC RBC-ENTMCNC: 31.4 G/DL — LOW (ref 32–36)
MCHC RBC-ENTMCNC: 31.6 GM/DL
MCV RBC AUTO: 89.2 FL — SIGNIFICANT CHANGE UP (ref 80–100)
MCV RBC AUTO: 89.6 FL
MONOCYTES # BLD AUTO: 0.74 K/UL
MONOCYTES NFR BLD AUTO: 10.1 %
MONOCYTES NFR BLD AUTO: 8.1 % — SIGNIFICANT CHANGE UP (ref 2–14)
NEUTROPHILS # BLD AUTO: 4.86 K/UL
NEUTROPHILS NFR BLD AUTO: 66.4 %
NEUTROPHILS NFR BLD AUTO: 78.2 % — HIGH (ref 43–77)
NITRITE URINE: NEGATIVE
PH URINE: 5.5
PLATELET # BLD AUTO: 233 K/UL — SIGNIFICANT CHANGE UP (ref 150–400)
PLATELET # BLD AUTO: 266 K/UL
POTASSIUM SERPL-MCNC: 3.5 MMOL/L — SIGNIFICANT CHANGE UP (ref 3.5–5.3)
POTASSIUM SERPL-MCNC: 3.8 MMOL/L — SIGNIFICANT CHANGE UP (ref 3.5–5.3)
POTASSIUM SERPL-SCNC: 3.5 MMOL/L — SIGNIFICANT CHANGE UP (ref 3.5–5.3)
POTASSIUM SERPL-SCNC: 3.8 MMOL/L — SIGNIFICANT CHANGE UP (ref 3.5–5.3)
POTASSIUM SERPL-SCNC: 4 MMOL/L
PROT SERPL-MCNC: 5.9 G/DL — LOW (ref 6–8.3)
PROT SERPL-MCNC: 6.9 G/DL
PROTEIN URINE: 30 MG/DL
PROTHROM AB SERPL-ACNC: 91.8 SEC — HIGH (ref 9.8–12.7)
PT BLD: 78.8 SEC
RBC # BLD: 4.13 M/UL
RBC # BLD: 4.18 M/UL — SIGNIFICANT CHANGE UP (ref 3.8–5.2)
RBC # FLD: 15.4 % — SIGNIFICANT CHANGE UP (ref 10.3–16.9)
RBC # FLD: 15.8 %
SODIUM SERPL-SCNC: 140 MMOL/L — SIGNIFICANT CHANGE UP (ref 135–145)
SODIUM SERPL-SCNC: 141 MMOL/L
SODIUM SERPL-SCNC: 141 MMOL/L — SIGNIFICANT CHANGE UP (ref 135–145)
SPECIFIC GRAVITY URINE: 1.02
TRIGL SERPL-MCNC: 62 MG/DL
TSH SERPL-ACNC: 2.48 UIU/ML
UROBILINOGEN URINE: NEGATIVE MG/DL
WBC # BLD: 8.4 K/UL — SIGNIFICANT CHANGE UP (ref 3.8–10.5)
WBC # FLD AUTO: 7.32 K/UL
WBC # FLD AUTO: 8.4 K/UL — SIGNIFICANT CHANGE UP (ref 3.8–10.5)

## 2018-09-20 PROCEDURE — 93306 TTE W/DOPPLER COMPLETE: CPT | Mod: 26

## 2018-09-20 PROCEDURE — 99232 SBSQ HOSP IP/OBS MODERATE 35: CPT | Mod: 25

## 2018-09-20 PROCEDURE — 92960 CARDIOVERSION ELECTRIC EXT: CPT

## 2018-09-20 RX ORDER — METOPROLOL TARTRATE 50 MG
25 TABLET ORAL DAILY
Qty: 0 | Refills: 0 | Status: DISCONTINUED | OUTPATIENT
Start: 2018-09-20 | End: 2018-09-22

## 2018-09-20 RX ADMIN — LOSARTAN POTASSIUM 100 MILLIGRAM(S): 100 TABLET, FILM COATED ORAL at 03:13

## 2018-09-20 RX ADMIN — Medication 25 MILLIGRAM(S): at 03:13

## 2018-09-20 RX ADMIN — Medication 25 MILLIGRAM(S): at 18:00

## 2018-09-20 NOTE — PROGRESS NOTE ADULT - SUBJECTIVE AND OBJECTIVE BOX
EPS Progress Note    S: Pt c/o anxiety, palpitations and mild lightheadedness, otherwise, denies, fatigue, c/p, sob and syncope.     T(C): 36.8 (09-20-18 @ 10:00), Max: 36.8 (09-20-18 @ 03:36)  HR: 144 (09-20-18 @ 10:00) (59 - 152)  BP: 126/83 (09-20-18 @ 10:00) (106/90 - 173/63)  RR: 18 (09-20-18 @ 10:00) (17 - 20)  SpO2: 99% (09-20-18 @ 10:00) (95% - 100%)     Telemetry: Atypical Atrial Flutter 138 bpm          General:  No acute distress      Chest:  Chest is clear to auscultation bilaterally without wheezes, crackles, or rhonchi  Cardiac: Rapid, irregular.  No murmur, rubs, or gallops heard.  Abdomen:  Soft without rebound or guarding.  Bowel sounds are presnt in all 4 quadrants.  No hepatosplenomegaly  Extremities:  No lower extremity edema is present.  No cyanosis or clubbing     MEDICATIONS  (STANDING):  diltiazem    Tablet 30 milliGRAM(s) Oral every 6 hours  losartan 100 milliGRAM(s) Oral daily  ondansetron Injectable 4 milliGRAM(s) IV Push once    LABS:                        11.7   8.4   )-----------( 233      ( 20 Sep 2018 06:47 )             37.3     09-20    141  |  106  |  16  ----------------------------<  87  3.8   |  24  |  0.91    Ca    9.4      20 Sep 2018 06:47    TPro  7.7  /  Alb  4.4  /  TBili  0.7  /  DBili  x   /  AST  32  /  ALT  30  /  AlkPhos  74  09-19  PT/INR - ( 20 Sep 2018 06:47 )   PT: 91.8 sec;   INR: 7.93     PTT - ( 20 Sep 2018 06:47 )  PTT:67.2 sec

## 2018-09-20 NOTE — PROGRESS NOTE ADULT - PROBLEM SELECTOR PLAN 1
Atypical Atrial Flutter. Now s/p DCCV. HR now sinus at 60 bpm.   - Resume Toprol 25 mg daily (home dose). Stop cardizem. Atypical Atrial Flutter. Now s/p DCCV. HR now sinus at 60 bpm.   - Resume Toprol 25 mg daily (home dose). Stop cardizem.  - Obtain 2D echo today.

## 2018-09-20 NOTE — PROGRESS NOTE ADULT - ASSESSMENT
Ms. 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 Thacker, 17 years ago), nephrolithiasis who presents from Dr. Michaels's office with newly diagnosed atrial flutter with RVR.  Reports onset of intermittent palpitations one week ago, becoming persistent over the last two days.  She denies associated CP, SOB/LOAIZA, dizziness, presyncope/syncope.  Reports compliant with Coumadin at home, no interruption in therapy.  Denies any bleeding issues. Upon arrival she was found to by in atypical atrial flutter to 140 bpm. Of note, her INR is supratherapeutic at >7.

## 2018-09-21 LAB
ANION GAP SERPL CALC-SCNC: 12 MMOL/L — SIGNIFICANT CHANGE UP (ref 5–17)
APTT BLD: 69.4 SEC — HIGH (ref 27.5–37.4)
BUN SERPL-MCNC: 16 MG/DL — SIGNIFICANT CHANGE UP (ref 7–23)
CALCIUM SERPL-MCNC: 9.3 MG/DL — SIGNIFICANT CHANGE UP (ref 8.4–10.5)
CHLORIDE SERPL-SCNC: 102 MMOL/L — SIGNIFICANT CHANGE UP (ref 96–108)
CO2 SERPL-SCNC: 24 MMOL/L — SIGNIFICANT CHANGE UP (ref 22–31)
CREAT SERPL-MCNC: 0.84 MG/DL — SIGNIFICANT CHANGE UP (ref 0.5–1.3)
GLUCOSE SERPL-MCNC: 108 MG/DL — HIGH (ref 70–99)
HCT VFR BLD CALC: 32.8 % — LOW (ref 34.5–45)
HGB BLD-MCNC: 10.5 G/DL — LOW (ref 11.5–15.5)
INR BLD: 8.61 — CRITICAL HIGH (ref 0.88–1.16)
MCHC RBC-ENTMCNC: 28.4 PG — SIGNIFICANT CHANGE UP (ref 27–34)
MCHC RBC-ENTMCNC: 32 G/DL — SIGNIFICANT CHANGE UP (ref 32–36)
MCV RBC AUTO: 88.6 FL — SIGNIFICANT CHANGE UP (ref 80–100)
PLATELET # BLD AUTO: 210 K/UL — SIGNIFICANT CHANGE UP (ref 150–400)
POTASSIUM SERPL-MCNC: 3.8 MMOL/L — SIGNIFICANT CHANGE UP (ref 3.5–5.3)
POTASSIUM SERPL-SCNC: 3.8 MMOL/L — SIGNIFICANT CHANGE UP (ref 3.5–5.3)
PROTHROM AB SERPL-ACNC: 99.9 SEC — HIGH (ref 9.8–12.7)
RBC # BLD: 3.7 M/UL — LOW (ref 3.8–5.2)
RBC # FLD: 15 % — SIGNIFICANT CHANGE UP (ref 10.3–16.9)
SODIUM SERPL-SCNC: 138 MMOL/L — SIGNIFICANT CHANGE UP (ref 135–145)
WBC # BLD: 7.9 K/UL — SIGNIFICANT CHANGE UP (ref 3.8–10.5)
WBC # FLD AUTO: 7.9 K/UL — SIGNIFICANT CHANGE UP (ref 3.8–10.5)

## 2018-09-21 PROCEDURE — 99232 SBSQ HOSP IP/OBS MODERATE 35: CPT

## 2018-09-21 RX ORDER — POTASSIUM CHLORIDE 20 MEQ
40 PACKET (EA) ORAL ONCE
Qty: 0 | Refills: 0 | Status: COMPLETED | OUTPATIENT
Start: 2018-09-21 | End: 2018-09-21

## 2018-09-21 RX ORDER — ACETAMINOPHEN 500 MG
650 TABLET ORAL ONCE
Qty: 0 | Refills: 0 | Status: COMPLETED | OUTPATIENT
Start: 2018-09-21 | End: 2018-09-21

## 2018-09-21 RX ADMIN — Medication 40 MILLIEQUIVALENT(S): at 11:25

## 2018-09-21 RX ADMIN — Medication 650 MILLIGRAM(S): at 03:24

## 2018-09-21 RX ADMIN — Medication 25 MILLIGRAM(S): at 05:55

## 2018-09-21 RX ADMIN — Medication 650 MILLIGRAM(S): at 04:15

## 2018-09-21 RX ADMIN — LOSARTAN POTASSIUM 100 MILLIGRAM(S): 100 TABLET, FILM COATED ORAL at 05:55

## 2018-09-21 NOTE — PROGRESS NOTE ADULT - SUBJECTIVE AND OBJECTIVE BOX
EPS Progress Note    S: No complaint today. Had dccv yesterday. Feels HR back to normal.  Unclear why INR is still high.  Denies any bleeding issue.      O: T(C): 36.9 (09-21-18 @ 09:48), Max: 36.9 (09-21-18 @ 09:48)  HR: 72 (09-21-18 @ 08:32) (65 - 72)  BP: 111/60 (09-21-18 @ 08:32) (111/60 - 132/64)  RR: 16 (09-21-18 @ 08:32) (16 - 18)  SpO2: 97% (09-21-18 @ 08:32) (93% - 98%)    TELE: NSR 60s     PHYSICAL  General:  NAD        Chest:  CTA B/L   Cardiac:   + s1/s2, RRR, metallic click.   Abdomen:  +BS , soft ND/NT  Extremities: No edema    LABS:                        10.5   7.9   )-----------( 210      ( 21 Sep 2018 06:34 )             32.8     09-20    140  |  104  |  15  ----------------------------<  86  3.5   |  23  |  0.91    Ca    9.1      20 Sep 2018 20:28    TPro  5.9<L>  /  Alb  3.3  /  TBili  0.6  /  DBili  x   /  AST  18  /  ALT  23  /  AlkPhos  56  09-20    PT/INR - ( 21 Sep 2018 06:34 )   PT: 99.9 sec;   INR: 8.61          PTT - ( 21 Sep 2018 06:34 )  PTT:69.4 sec    MEDICATIONS:  losartan 100 milliGRAM(s) Oral daily  metoprolol succinate ER 25 milliGRAM(s) Oral daily  ondansetron Injectable 4 milliGRAM(s) IV Push once

## 2018-09-21 NOTE — PROGRESS NOTE ADULT - ASSESSMENT
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 s/p cardioversion on 9/20. Remains in NSR.  She presented with subratherapeutic INR which is still high today despite no Coumadin given for the past 2 nights and no need medications / med changes recently. - INR 8.61 today.  Still holding Coumadin. Keep today and check INR tomorrow. IF it starts to trend down, will discharge and to restart Coumadin at lower dose. She would then need to check INR on monday and have Dr. De Santiago manage Coumadin dosing.   - Continue Toprol and Losartan.   - Will continue to monitor CBC, which is slightly lower today. However, no signs of bleeding. Will check for stool occult.   - Case d/w Dr. Teresa

## 2018-09-22 ENCOUNTER — TRANSCRIPTION ENCOUNTER (OUTPATIENT)
Age: 68
End: 2018-09-22

## 2018-09-22 VITALS — TEMPERATURE: 99 F

## 2018-09-22 LAB
ANION GAP SERPL CALC-SCNC: 13 MMOL/L — SIGNIFICANT CHANGE UP (ref 5–17)
APTT BLD: 53.4 SEC — HIGH (ref 27.5–37.4)
BUN SERPL-MCNC: 12 MG/DL — SIGNIFICANT CHANGE UP (ref 7–23)
CALCIUM SERPL-MCNC: 9.6 MG/DL — SIGNIFICANT CHANGE UP (ref 8.4–10.5)
CHLORIDE SERPL-SCNC: 100 MMOL/L — SIGNIFICANT CHANGE UP (ref 96–108)
CO2 SERPL-SCNC: 24 MMOL/L — SIGNIFICANT CHANGE UP (ref 22–31)
CREAT SERPL-MCNC: 0.86 MG/DL — SIGNIFICANT CHANGE UP (ref 0.5–1.3)
GLUCOSE SERPL-MCNC: 101 MG/DL — HIGH (ref 70–99)
HCT VFR BLD CALC: 35.2 % — SIGNIFICANT CHANGE UP (ref 34.5–45)
HGB BLD-MCNC: 11.1 G/DL — LOW (ref 11.5–15.5)
INR BLD: 4.35 — HIGH (ref 0.88–1.16)
MAGNESIUM SERPL-MCNC: 1.9 MG/DL — SIGNIFICANT CHANGE UP (ref 1.6–2.6)
MCHC RBC-ENTMCNC: 27.7 PG — SIGNIFICANT CHANGE UP (ref 27–34)
MCHC RBC-ENTMCNC: 31.5 G/DL — LOW (ref 32–36)
MCV RBC AUTO: 87.8 FL — SIGNIFICANT CHANGE UP (ref 80–100)
PLATELET # BLD AUTO: 230 K/UL — SIGNIFICANT CHANGE UP (ref 150–400)
POTASSIUM SERPL-MCNC: 4.2 MMOL/L — SIGNIFICANT CHANGE UP (ref 3.5–5.3)
POTASSIUM SERPL-SCNC: 4.2 MMOL/L — SIGNIFICANT CHANGE UP (ref 3.5–5.3)
PROTHROM AB SERPL-ACNC: 49.8 SEC — HIGH (ref 9.8–12.7)
RBC # BLD: 4.01 M/UL — SIGNIFICANT CHANGE UP (ref 3.8–5.2)
RBC # FLD: 15.1 % — SIGNIFICANT CHANGE UP (ref 10.3–16.9)
SODIUM SERPL-SCNC: 137 MMOL/L — SIGNIFICANT CHANGE UP (ref 135–145)
WBC # BLD: 8.1 K/UL — SIGNIFICANT CHANGE UP (ref 3.8–10.5)
WBC # FLD AUTO: 8.1 K/UL — SIGNIFICANT CHANGE UP (ref 3.8–10.5)

## 2018-09-22 PROCEDURE — 93306 TTE W/DOPPLER COMPLETE: CPT

## 2018-09-22 PROCEDURE — 99291 CRITICAL CARE FIRST HOUR: CPT | Mod: 25

## 2018-09-22 PROCEDURE — 71045 X-RAY EXAM CHEST 1 VIEW: CPT

## 2018-09-22 PROCEDURE — 84484 ASSAY OF TROPONIN QUANT: CPT

## 2018-09-22 PROCEDURE — 85027 COMPLETE CBC AUTOMATED: CPT

## 2018-09-22 PROCEDURE — 96374 THER/PROPH/DIAG INJ IV PUSH: CPT

## 2018-09-22 PROCEDURE — 80053 COMPREHEN METABOLIC PANEL: CPT

## 2018-09-22 PROCEDURE — 80048 BASIC METABOLIC PNL TOTAL CA: CPT

## 2018-09-22 PROCEDURE — 85025 COMPLETE CBC W/AUTO DIFF WBC: CPT

## 2018-09-22 PROCEDURE — 83735 ASSAY OF MAGNESIUM: CPT

## 2018-09-22 PROCEDURE — 82553 CREATINE MB FRACTION: CPT

## 2018-09-22 PROCEDURE — 93005 ELECTROCARDIOGRAM TRACING: CPT

## 2018-09-22 PROCEDURE — 36415 COLL VENOUS BLD VENIPUNCTURE: CPT

## 2018-09-22 PROCEDURE — 85730 THROMBOPLASTIN TIME PARTIAL: CPT

## 2018-09-22 PROCEDURE — 85610 PROTHROMBIN TIME: CPT

## 2018-09-22 PROCEDURE — 82550 ASSAY OF CK (CPK): CPT

## 2018-09-22 RX ORDER — ACETAMINOPHEN 500 MG
650 TABLET ORAL ONCE
Qty: 0 | Refills: 0 | Status: COMPLETED | OUTPATIENT
Start: 2018-09-22 | End: 2018-09-22

## 2018-09-22 RX ORDER — WARFARIN SODIUM 2.5 MG/1
1 TABLET ORAL
Qty: 5 | Refills: 0
Start: 2018-09-22 | End: 2018-09-26

## 2018-09-22 RX ORDER — MAGNESIUM OXIDE 400 MG ORAL TABLET 241.3 MG
400 TABLET ORAL ONCE
Qty: 0 | Refills: 0 | Status: COMPLETED | OUTPATIENT
Start: 2018-09-22 | End: 2018-09-22

## 2018-09-22 RX ADMIN — MAGNESIUM OXIDE 400 MG ORAL TABLET 400 MILLIGRAM(S): 241.3 TABLET ORAL at 10:29

## 2018-09-22 RX ADMIN — Medication 650 MILLIGRAM(S): at 06:45

## 2018-09-22 RX ADMIN — LOSARTAN POTASSIUM 100 MILLIGRAM(S): 100 TABLET, FILM COATED ORAL at 06:34

## 2018-09-22 RX ADMIN — Medication 650 MILLIGRAM(S): at 07:45

## 2018-09-22 RX ADMIN — Medication 25 MILLIGRAM(S): at 06:34

## 2018-09-22 NOTE — DISCHARGE NOTE ADULT - MEDICATION SUMMARY - MEDICATIONS TO TAKE
I will START or STAY ON the medications listed below when I get home from the hospital:    losartan  -- 100 milligram(s) by mouth once a day  -- Indication: For BLOOD PRESSURE    Coumadin 5 mg oral tablet  -- 1 tab(s) by mouth once a day   -- Do not take this drug if you are pregnant.  It is very important that you take or use this exactly as directed.  Do not skip doses or discontinue unless directed by your doctor.  Obtain medical advice before taking any non-prescription drugs as some may affect the action of this medication.    -- Indication: For BLOOD THINNER FOR INR    Toprol-XL 25 mg oral tablet, extended release  -- 1 tab(s) by mouth once a day  -- Indication: For BLOOD PRESSURE/ATRIAL FIBRILLATION

## 2018-09-22 NOTE — DISCHARGE NOTE ADULT - PATIENT PORTAL LINK FT
You can access the [facility] Patient Portal, offered by [facility], by registering with the following website: [email or web address]

## 2018-09-22 NOTE — DISCHARGE NOTE ADULT - PLAN OF CARE
Please continue your Toprol XL 25 mg daily. In addition please take Coumadin 5 mg daily tonight and tomorrow. You will need to see Dr. Michaels on Monday 9/24/2018 for an INR check Please take Coumadin 5 mg tonight and tomorrow night. You MUST see Dr. Michaels on Monday 9/24/2018 for an INR CHECK. Your INR level was extremely high while you were admitted with a range of 7.0-8.0. We held your Coumadin and your INR level is now 4.35.   Dr. Sun would like you to take Coumadin 5 mg tonight and tomorrow (Sunday) and see Dr. Michaels on Monday for an INR check. Please follow up with Dr. Teresa October 24, 2018 @ 9:30 AM Your INR level was extremely high while you were admitted with a range of 7.0-8.0. We held your Coumadin and your INR level is now 4.35.   Dr. Sun would like you to take Coumadin 5 mg tonight and tomorrow (Sunday) and see Dr. Michaels on Monday for an INR check.   -To help warfarin work well, it is important to keep your vitamin K intake as consistent as possible. A large increase in vitamin K can lower your INR to cause dangerous clotting in your blood.  A large decrease in vitamin K can raise your INR to make it harder for your blood to clot. This could cause you to bleed too much. You do not need to avoid or follow a diet low in vitamin K while taking warfarin. It is just important you keep it consistent. Please take Coumadin 5 mg today and tomorrow. Please see Dr. Michaels Monday 9/24/2018 Please continue your Losartan 100 mg daily and Toprol XL 25 mg daily.

## 2018-09-22 NOTE — DISCHARGE NOTE ADULT - HOSPITAL COURSE
68 yo female with PMH of HTN, rheumatic mitral valve disease s/p mechanical mitral valve (17 years ago, on Coumadin), nephrolithiasis, and recently diagnosed atypical aflutter with RVR up to 140 bpm presented to [facility] ED (9/19/2018) with symptomatic rapid Atrial Flutter with RVR and is now s/p DCCV (9/20/2018).  Telemetry has been reviewed by Dr. Sun this AM; patient with short burst of Atach with aberrancy overnight but no need for further monitoring at this time. Patient will continue Toprol XL 25 mg daily. Echo (9/20/2018) revealed normal LV size and wall thickness, basal inferior wall severe hypokinesis, LVEF 50-55%, mechanical mitral valve, prosthetic mitral valve appears to function well. Moderate TR and moderate pulmonary HTN. Hospital course also significant for supra therapeutic INR with peak INR level of 8.61. Patient’s home Coumadin has been held since 9/19/2018 and repeat INR today is 4.35. Per Dr. Sun patient may be discharged with this current INR level and has been instructed to take Coumadin 5 mg tonight and tomorrow to ensure INR does not fall below 2.5 in the setting of mechanical mitral valve. Patient will follow up with Dr. Michaels on Monday for an INR check. Patient has been updated on plan of care with Dr. Sun at bedside and has been deemed stable for discharge at this time.

## 2018-09-22 NOTE — DISCHARGE NOTE ADULT - ADDITIONAL INSTRUCTIONS
Please follow up with Dr. Teresa October 24, 2018 @ 9:30 AM   Please follow up with Dr. Michaels Monday 9/24/2018

## 2018-09-22 NOTE — DISCHARGE NOTE ADULT - CARE PLAN
Principal Discharge DX:	Atrial flutter with rapid ventricular response  Goal:	Please continue your Toprol XL 25 mg daily. In addition please take Coumadin 5 mg daily tonight and tomorrow. You will need to see Dr. Michaels on Monday 9/24/2018 for an INR check  Secondary Diagnosis:	Elevated INR  Goal:	Please take Coumadin 5 mg tonight and tomorrow night. You MUST see Dr. Michaels on Monday 9/24/2018 for an INR CHECK.  Assessment and plan of treatment:	Your INR level was extremely high while you were admitted with a range of 7.0-8.0. We held your Coumadin and your INR level is now 4.35.   Dr. Sun would like you to take Coumadin 5 mg tonight and tomorrow (Sunday) and see Dr. Michaels on Monday for an INR check.  Secondary Diagnosis:	Mitral valve replaced  Secondary Diagnosis:	HTN (hypertension) Principal Discharge DX:	Atrial flutter with rapid ventricular response  Goal:	Please continue your Toprol XL 25 mg daily. In addition please take Coumadin 5 mg daily tonight and tomorrow. You will need to see Dr. Michaels on Monday 9/24/2018 for an INR check  Assessment and plan of treatment:	Please follow up with Dr. Teresa October 24, 2018 @ 9:30 AM  Secondary Diagnosis:	Elevated INR  Goal:	Please take Coumadin 5 mg tonight and tomorrow night. You MUST see Dr. Michaels on Monday 9/24/2018 for an INR CHECK.  Assessment and plan of treatment:	Your INR level was extremely high while you were admitted with a range of 7.0-8.0. We held your Coumadin and your INR level is now 4.35.   Dr. Sun would like you to take Coumadin 5 mg tonight and tomorrow (Sunday) and see Dr. Michaels on Monday for an INR check.   -To help warfarin work well, it is important to keep your vitamin K intake as consistent as possible. A large increase in vitamin K can lower your INR to cause dangerous clotting in your blood.  A large decrease in vitamin K can raise your INR to make it harder for your blood to clot. This could cause you to bleed too much. You do not need to avoid or follow a diet low in vitamin K while taking warfarin. It is just important you keep it consistent.  Secondary Diagnosis:	Mitral valve replaced  Goal:	Please take Coumadin 5 mg today and tomorrow. Please see Dr. Michaels Monday 9/24/2018  Secondary Diagnosis:	HTN (hypertension)  Goal:	Please continue your Losartan 100 mg daily and Toprol XL 25 mg daily.

## 2018-09-22 NOTE — DISCHARGE NOTE ADULT - CARE PROVIDER_API CALL
Jason Teresa), Cardiac Electrophysiology; Cardiology; Cardiovascular Disease  [address] [place] [address]  Phone: [phone]  Fax: [phone]    Jose Michaels), Cardiovascular Disease; Internal Medicine; Nuclear Cardiology  [address] [place] [address]  Phone: [phone]  Fax: [phone]

## 2018-09-22 NOTE — DISCHARGE NOTE ADULT - CARE PROVIDERS DIRECT ADDRESSES
[email or web address]

## 2018-09-24 ENCOUNTER — APPOINTMENT (OUTPATIENT)
Dept: HEART AND VASCULAR | Facility: CLINIC | Age: 68
End: 2018-09-24
Payer: COMMERCIAL

## 2018-09-24 VITALS
SYSTOLIC BLOOD PRESSURE: 150 MMHG | RESPIRATION RATE: 14 BRPM | BODY MASS INDEX: 26.16 KG/M2 | OXYGEN SATURATION: 95 % | HEART RATE: 73 BPM | DIASTOLIC BLOOD PRESSURE: 90 MMHG | WEIGHT: 157 LBS | HEIGHT: 65 IN | TEMPERATURE: 97.4 F

## 2018-09-24 PROBLEM — I48.92 UNSPECIFIED ATRIAL FLUTTER: Chronic | Status: ACTIVE | Noted: 2018-09-19

## 2018-09-24 LAB
INR PPP: 2.3 RATIO
POCT-PROTHROMBIN TIME: 28 SECS

## 2018-09-24 PROCEDURE — 85610 PROTHROMBIN TIME: CPT | Mod: QW

## 2018-09-26 DIAGNOSIS — I48.92 UNSPECIFIED ATRIAL FLUTTER: ICD-10-CM

## 2018-09-26 DIAGNOSIS — I27.20 PULMONARY HYPERTENSION, UNSPECIFIED: ICD-10-CM

## 2018-09-26 DIAGNOSIS — I10 ESSENTIAL (PRIMARY) HYPERTENSION: ICD-10-CM

## 2018-09-26 DIAGNOSIS — I07.1 RHEUMATIC TRICUSPID INSUFFICIENCY: ICD-10-CM

## 2018-09-26 DIAGNOSIS — Z95.2 PRESENCE OF PROSTHETIC HEART VALVE: ICD-10-CM

## 2018-09-26 DIAGNOSIS — T45.515A ADVERSE EFFECT OF ANTICOAGULANTS, INITIAL ENCOUNTER: ICD-10-CM

## 2018-09-26 DIAGNOSIS — R79.1 ABNORMAL COAGULATION PROFILE: ICD-10-CM

## 2018-10-01 ENCOUNTER — APPOINTMENT (OUTPATIENT)
Dept: HEART AND VASCULAR | Facility: CLINIC | Age: 68
End: 2018-10-01
Payer: COMMERCIAL

## 2018-10-01 VITALS
RESPIRATION RATE: 14 BRPM | WEIGHT: 157 LBS | TEMPERATURE: 97.5 F | OXYGEN SATURATION: 97 % | DIASTOLIC BLOOD PRESSURE: 80 MMHG | HEART RATE: 76 BPM | HEIGHT: 65 IN | SYSTOLIC BLOOD PRESSURE: 139 MMHG | BODY MASS INDEX: 26.16 KG/M2

## 2018-10-01 LAB
INR PPP: 3.2 RATIO
POCT-PROTHROMBIN TIME: 38.6 SECS

## 2018-10-01 PROCEDURE — 99213 OFFICE O/P EST LOW 20 MIN: CPT

## 2018-10-02 ENCOUNTER — RX RENEWAL (OUTPATIENT)
Age: 68
End: 2018-10-02

## 2018-10-16 ENCOUNTER — RX RENEWAL (OUTPATIENT)
Age: 68
End: 2018-10-16

## 2018-10-25 ENCOUNTER — APPOINTMENT (OUTPATIENT)
Dept: HEART AND VASCULAR | Facility: CLINIC | Age: 68
End: 2018-10-25
Payer: COMMERCIAL

## 2018-10-25 VITALS
DIASTOLIC BLOOD PRESSURE: 79 MMHG | HEART RATE: 84 BPM | HEIGHT: 65 IN | SYSTOLIC BLOOD PRESSURE: 179 MMHG | WEIGHT: 153 LBS | BODY MASS INDEX: 25.49 KG/M2

## 2018-10-25 PROCEDURE — 99215 OFFICE O/P EST HI 40 MIN: CPT | Mod: 25

## 2018-10-25 PROCEDURE — 93000 ELECTROCARDIOGRAM COMPLETE: CPT

## 2018-11-12 ENCOUNTER — APPOINTMENT (OUTPATIENT)
Dept: HEART AND VASCULAR | Facility: CLINIC | Age: 68
End: 2018-11-12
Payer: COMMERCIAL

## 2018-11-12 VITALS
OXYGEN SATURATION: 98 % | HEART RATE: 70 BPM | SYSTOLIC BLOOD PRESSURE: 124 MMHG | TEMPERATURE: 97.7 F | RESPIRATION RATE: 14 BRPM | HEIGHT: 65 IN | BODY MASS INDEX: 25.49 KG/M2 | WEIGHT: 153 LBS | DIASTOLIC BLOOD PRESSURE: 85 MMHG

## 2018-11-12 LAB
INR PPP: 2.7 RATIO
POCT-PROTHROMBIN TIME: 32.3 SECS

## 2018-11-12 PROCEDURE — 99214 OFFICE O/P EST MOD 30 MIN: CPT

## 2018-11-12 NOTE — HISTORY OF PRESENT ILLNESS
[FreeTextEntry1] : had dccv of AFl at [facility] after ELIU\par feels better\par dose lowered to 5mg daily\par no bleeding or bruising\par no further episodes\par had f/u w EP

## 2018-11-12 NOTE — DISCUSSION/SUMMARY
[___ Month(s)] : [unfilled] month(s) [With Me] : with me [FreeTextEntry1] : inr 2.7, same dose- she will do q 2 weeks for next few months \par will defer ablation for now\par bp better\par

## 2018-11-12 NOTE — PHYSICAL EXAM
[General Appearance - Well Developed] : well developed [Normal Appearance] : normal appearance [Well Groomed] : well groomed [General Appearance - Well Nourished] : well nourished [No Deformities] : no deformities [General Appearance - In No Acute Distress] : no acute distress [Normal Conjunctiva] : the conjunctiva exhibited no abnormalities [Eyelids - No Xanthelasma] : the eyelids demonstrated no xanthelasmas [Normal Oral Mucosa] : normal oral mucosa [No Oral Pallor] : no oral pallor [No Oral Cyanosis] : no oral cyanosis [Normal Jugular Venous A Waves Present] : normal jugular venous A waves present [Normal Jugular Venous V Waves Present] : normal jugular venous V waves present [No Jugular Venous Self A Waves] : no jugular venous self A waves [Respiration, Rhythm And Depth] : normal respiratory rhythm and effort [Exaggerated Use Of Accessory Muscles For Inspiration] : no accessory muscle use [Auscultation Breath Sounds / Voice Sounds] : lungs were clear to auscultation bilaterally [Abdomen Soft] : soft [Abdomen Tenderness] : non-tender [Abdomen Mass (___ Cm)] : no abdominal mass palpated [Abnormal Walk] : normal gait [Gait - Sufficient For Exercise Testing] : the gait was sufficient for exercise testing [Nail Clubbing] : no clubbing of the fingernails [Cyanosis, Localized] : no localized cyanosis [Petechial Hemorrhages (___cm)] : no petechial hemorrhages [Skin Color & Pigmentation] : normal skin color and pigmentation [] : no rash [No Venous Stasis] : no venous stasis [Skin Lesions] : no skin lesions [No Skin Ulcers] : no skin ulcer [No Xanthoma] : no  xanthoma was observed [Oriented To Time, Place, And Person] : oriented to person, place, and time [Affect] : the affect was normal [Mood] : the mood was normal [No Anxiety] : not feeling anxious [FreeTextEntry1] : 3-4 cm area of edema and erythema on the michael surface of r hand

## 2018-11-16 ENCOUNTER — RX RENEWAL (OUTPATIENT)
Age: 68
End: 2018-11-16

## 2018-12-05 NOTE — ED ADULT NURSE NOTE - PSYCHOSOCIAL WDL
Telephone Encounter by Fallon Carreon RN at 05/15/18 11:39 AM     Author:  Fallon Carreon RN Service:  (none) Author Type:  Registered Nurse     Filed:  05/15/18 11:39 AM Encounter Date:  5/15/2018 Status:  Signed     :  Fallon Carreon RN (Registered Nurse)       From: Nalini Cochran  To: Francine Welch MD  Sent: 5/15/2018  9:44 AM CDT  Subject: Lab Tests  Test Related Questions    Based on the lab results, I am requesting a prescription or suggestion on   an iron supplement.  My ferritin was quite low even though it was in   range.  The range is very broad, and I have read that it should be at   least 30 if not 50 (mine was 18).  My hair is falling out more and more,   and I still have dizziness, fatigue, weakness, brain fog, and off balance.    Thank you!       Revision History        Date/Time User Provider Type Action    > 05/15/18 11:39 AM Fallon Carreon RN Registered Nurse Sign    Attribution information within the note text is not available.            
Telephone Encounter by Fallon Carreon RN at 05/15/18 11:41 AM     Author:  Fallon Carreon RN Service:  (none) Author Type:  Registered Nurse     Filed:  05/15/18 11:41 AM Encounter Date:  5/15/2018 Status:  Signed     :  Fallon Carreon RN (Registered Nurse)            To Dr Leon  5/10/18 ov notes not completed[DC1.1M]      Revision History        User Key Date/Time User Provider Type Action    > DC1.1 05/15/18 11:41 AM Fallon Carreon, RN Registered Nurse Sign    M - Manual            
Telephone Encounter by Francine Welch MD at 05/15/18 02:48 PM     Author:  Francine Welch MD Service:  (none) Author Type:  Physician     Filed:  05/15/18 02:48 PM Encounter Date:  5/15/2018 Status:  Signed     :  Francine Welch MD (Physician)            She can take ferrous sulfate 325 mg, 1 tab p.o. daily.  Which is OTC.  Recommend she stop iron supplement after 2 months.[AG1.1M]      Revision History        User Key Date/Time User Provider Type Action    > AG1.1 05/15/18 02:48 PM Francine Welch MD Physician Sign    M - Manual            
Telephone Encounter by Judy Guy NCMA at 05/18/18 06:47 AM     Author:  Judy Guy NCMA Service:  (none) Author Type:  Certified Medical Assistant     Filed:  05/18/18 06:47 AM Encounter Date:  5/15/2018 Status:  Signed     :  Judy Guy NCMA (Certified Medical Assistant)            Pt notified via IP Street.[TR1.1M]      Revision History        User Key Date/Time User Provider Type Action    > TR1.1 05/18/18 06:47 AM Judy Guy NCMA Certified Medical Assistant Sign    M - Manual            
Alert and oriented x 3, normal mood and affect, no apparent risk to self or others.

## 2018-12-14 ENCOUNTER — RX RENEWAL (OUTPATIENT)
Age: 68
End: 2018-12-14

## 2018-12-14 NOTE — DISCHARGE NOTE ADULT - NSCORESITESY/N_GEN_A_CORE_RD
Pt daughter to laura a apt for defibrillator check please call to laura after putting in order    No

## 2018-12-31 ENCOUNTER — RX RENEWAL (OUTPATIENT)
Age: 68
End: 2018-12-31

## 2019-02-12 ENCOUNTER — RX RENEWAL (OUTPATIENT)
Age: 69
End: 2019-02-12

## 2019-02-19 ENCOUNTER — APPOINTMENT (OUTPATIENT)
Dept: HEART AND VASCULAR | Facility: CLINIC | Age: 69
End: 2019-02-19
Payer: COMMERCIAL

## 2019-02-19 VITALS
TEMPERATURE: 98 F | OXYGEN SATURATION: 98 % | SYSTOLIC BLOOD PRESSURE: 120 MMHG | BODY MASS INDEX: 25.49 KG/M2 | DIASTOLIC BLOOD PRESSURE: 80 MMHG | WEIGHT: 153 LBS | HEIGHT: 65 IN | HEART RATE: 70 BPM | RESPIRATION RATE: 14 BRPM

## 2019-02-19 PROCEDURE — 99214 OFFICE O/P EST MOD 30 MIN: CPT

## 2019-02-19 PROCEDURE — 36415 COLL VENOUS BLD VENIPUNCTURE: CPT

## 2019-02-19 PROCEDURE — 93000 ELECTROCARDIOGRAM COMPLETE: CPT

## 2019-02-19 NOTE — HISTORY OF PRESENT ILLNESS
[FreeTextEntry1] : f/u of AF, MVR\par dose lowered to 5mg daily- inr's have been OK\par no bleeding or bruising\par no further episodes\par no cp or dyspnea

## 2019-02-19 NOTE — DISCUSSION/SUMMARY
[___ Month(s)] : [unfilled] month(s) [With Me] : with me [FreeTextEntry1] : in nsr, no more AF episodes\par bp sell controlled\par got fluvax in hospt

## 2019-02-20 LAB
ALBUMIN SERPL ELPH-MCNC: 4.1 G/DL
ALP BLD-CCNC: 87 U/L
ALT SERPL-CCNC: 12 U/L
ANION GAP SERPL CALC-SCNC: 14 MMOL/L
APTT BLD: 41.5 SEC
AST SERPL-CCNC: 16 U/L
BASOPHILS # BLD AUTO: 0.05 K/UL
BASOPHILS NFR BLD AUTO: 0.7 %
BILIRUB SERPL-MCNC: 0.5 MG/DL
BUN SERPL-MCNC: 14 MG/DL
CALCIUM SERPL-MCNC: 9.8 MG/DL
CHLORIDE SERPL-SCNC: 105 MMOL/L
CHOLEST SERPL-MCNC: 204 MG/DL
CHOLEST/HDLC SERPL: 3 RATIO
CO2 SERPL-SCNC: 23 MMOL/L
CREAT SERPL-MCNC: 0.85 MG/DL
EOSINOPHIL # BLD AUTO: 0.13 K/UL
EOSINOPHIL NFR BLD AUTO: 1.8 %
GLUCOSE SERPL-MCNC: 89 MG/DL
HBA1C MFR BLD HPLC: 5.4 %
HCT VFR BLD CALC: 41 %
HDLC SERPL-MCNC: 67 MG/DL
HGB BLD-MCNC: 12.2 G/DL
IMM GRANULOCYTES NFR BLD AUTO: 0.3 %
INR PPP: 2 RATIO
LDLC SERPL CALC-MCNC: 123 MG/DL
LYMPHOCYTES # BLD AUTO: 1.15 K/UL
LYMPHOCYTES NFR BLD AUTO: 15.5 %
MAN DIFF?: NORMAL
MCHC RBC-ENTMCNC: 27.5 PG
MCHC RBC-ENTMCNC: 29.8 GM/DL
MCV RBC AUTO: 92.6 FL
MONOCYTES # BLD AUTO: 0.62 K/UL
MONOCYTES NFR BLD AUTO: 8.4 %
NEUTROPHILS # BLD AUTO: 5.43 K/UL
NEUTROPHILS NFR BLD AUTO: 73.3 %
PLATELET # BLD AUTO: 244 K/UL
POTASSIUM SERPL-SCNC: 3.9 MMOL/L
PROT SERPL-MCNC: 6.9 G/DL
PT BLD: 23.1 SEC
RBC # BLD: 4.43 M/UL
RBC # FLD: 15.2 %
SODIUM SERPL-SCNC: 142 MMOL/L
TRIGL SERPL-MCNC: 70 MG/DL
TSH SERPL-ACNC: 2.53 UIU/ML
WBC # FLD AUTO: 7.4 K/UL

## 2019-03-26 ENCOUNTER — CLINICAL ADVICE (OUTPATIENT)
Age: 69
End: 2019-03-26

## 2019-04-15 NOTE — PATIENT PROFILE ADULT. - TEACHING/LEARNING FACTORS INFLUENCE READINESS TO LEARN
"Requested Prescriptions   Pending Prescriptions Disp Refills     SPRINTEC 28 0.25-35 MG-MCG tablet [Pharmacy Med Name: Sprintec 28 0.25-35 MG-MCG Oral Tablet]  Last Written Prescription Date:  04/08/2019  Last Fill Quantity: 84,  # refills: 4   Last office visit: 4/8/2019 with prescribing provider:  04/08/2019   Future Office Visit:   Next 5 appointments (look out 90 days)    Apr 16, 2019 10:30 AM CDT  Return Visit with Andres Johnson DPM  [facility] ([place] [facility]) [address] [facility] [address]  [facility] [address]  [phone]   Apr 23, 2019  9:00 AM CDT  Return Visit with Gerda Mehta MD  [facility] [place], Infectious Disease ([facility]) [address] [place] [address]  [phone]   Apr 23, 2019 10:00 AM CDT  Return Visit with Ernestina Patel [facility] ([address] [address]  [phone]   May 07, 2019 10:00 AM CDT  Office Visit with Rosangela Hollingsworth [facility] ([facility]) [address] [place] [address]  [facility] [address]  [phone]   May 07, 2019 11:00 AM CDT  Return Visit with Ernestina Patel [facility] ([address] [address]  [phone]        84 tablet 4     Sig: Take one tablet by mouth daily. Take continuously.       Contraceptives Protocol Passed - 4/15/2019 12:42 PM        Passed - Patient is not a current smoker if age is 35 or older        Passed - Recent (12 mo) or future (30 days) visit within the authorizing provider's specialty     Patient had office visit in the last 12 months or has a visit in the next 30 days with authorizing provider or within the authorizing provider's specialty.  See \"Patient " "Info\" tab in inbasket, or \"Choose Columns\" in Meds & Orders section of the refill encounter.              Passed - Medication is active on med list        Passed - No active pregnancy on record        Passed - No positive pregnancy test in past 12 months        " none

## 2019-04-30 ENCOUNTER — CLINICAL ADVICE (OUTPATIENT)
Age: 69
End: 2019-04-30

## 2019-06-20 ENCOUNTER — CLINICAL ADVICE (OUTPATIENT)
Age: 69
End: 2019-06-20

## 2019-06-24 ENCOUNTER — APPOINTMENT (OUTPATIENT)
Dept: VASCULAR SURGERY | Facility: CLINIC | Age: 69
End: 2019-06-24
Payer: COMMERCIAL

## 2019-06-24 ENCOUNTER — APPOINTMENT (OUTPATIENT)
Dept: HEART AND VASCULAR | Facility: CLINIC | Age: 69
End: 2019-06-24
Payer: COMMERCIAL

## 2019-06-24 VITALS
TEMPERATURE: 98.1 F | HEART RATE: 72 BPM | HEIGHT: 65 IN | SYSTOLIC BLOOD PRESSURE: 140 MMHG | WEIGHT: 154 LBS | BODY MASS INDEX: 25.66 KG/M2 | DIASTOLIC BLOOD PRESSURE: 84 MMHG | RESPIRATION RATE: 14 BRPM | OXYGEN SATURATION: 98 %

## 2019-06-24 PROCEDURE — 36415 COLL VENOUS BLD VENIPUNCTURE: CPT

## 2019-06-24 PROCEDURE — 93000 ELECTROCARDIOGRAM COMPLETE: CPT

## 2019-06-24 PROCEDURE — 93971 EXTREMITY STUDY: CPT

## 2019-06-24 PROCEDURE — 99243 OFF/OP CNSLTJ NEW/EST LOW 30: CPT

## 2019-06-24 PROCEDURE — 99214 OFFICE O/P EST MOD 30 MIN: CPT

## 2019-06-24 NOTE — PHYSICAL EXAM
[General Appearance - Well Developed] : well developed [Normal Appearance] : normal appearance [Well Groomed] : well groomed [No Deformities] : no deformities [General Appearance - Well Nourished] : well nourished [General Appearance - In No Acute Distress] : no acute distress [Normal Conjunctiva] : the conjunctiva exhibited no abnormalities [Eyelids - No Xanthelasma] : the eyelids demonstrated no xanthelasmas [Normal Oral Mucosa] : normal oral mucosa [No Oral Pallor] : no oral pallor [No Oral Cyanosis] : no oral cyanosis [Normal Jugular Venous A Waves Present] : normal jugular venous A waves present [Normal Jugular Venous V Waves Present] : normal jugular venous V waves present [No Jugular Venous Self A Waves] : no jugular venous sefl A waves [Respiration, Rhythm And Depth] : normal respiratory rhythm and effort [Exaggerated Use Of Accessory Muscles For Inspiration] : no accessory muscle use [Auscultation Breath Sounds / Voice Sounds] : lungs were clear to auscultation bilaterally [Abdomen Soft] : soft [Abdomen Tenderness] : non-tender [Abdomen Mass (___ Cm)] : no abdominal mass palpated [Abnormal Walk] : normal gait [Gait - Sufficient For Exercise Testing] : the gait was sufficient for exercise testing [Nail Clubbing] : no clubbing of the fingernails [Cyanosis, Localized] : no localized cyanosis [Petechial Hemorrhages (___cm)] : no petechial hemorrhages [Skin Color & Pigmentation] : normal skin color and pigmentation [] : no rash [No Venous Stasis] : no venous stasis [Skin Lesions] : no skin lesions [No Skin Ulcers] : no skin ulcer [No Xanthoma] : no  xanthoma was observed [Oriented To Time, Place, And Person] : oriented to person, place, and time [Affect] : the affect was normal [Mood] : the mood was normal [No Anxiety] : not feeling anxious [FreeTextEntry1] : 3-4 cm area of edema and erythema on the michael surface of r hand

## 2019-06-24 NOTE — DISCUSSION/SUMMARY
[With Me] : with me [___ Month(s)] : [unfilled] month(s) [FreeTextEntry1] : she will see vascular now\par check labs/inr\par echo next appt for the MVR [FreeTextEntry3] : echo

## 2019-06-24 NOTE — HISTORY OF PRESENT ILLNESS
[FreeTextEntry1] : f/u of AF, MVR\par dose lowered to 5mg daily- inr's have been OK\par no further episodes\par no cp or dyspnea\par cut herself on right leg shaving yesterday- wont stop bleedling, has pressure dressing- thinks she may have cut a superficial vein

## 2019-06-25 LAB
ALBUMIN SERPL ELPH-MCNC: 4.6 G/DL
ALP BLD-CCNC: 93 U/L
ALT SERPL-CCNC: 13 U/L
ANION GAP SERPL CALC-SCNC: 14 MMOL/L
APPEARANCE: CLEAR
APTT BLD: 44.9 SEC
AST SERPL-CCNC: 18 U/L
BASOPHILS # BLD AUTO: 0.06 K/UL
BASOPHILS NFR BLD AUTO: 0.9 %
BILIRUB SERPL-MCNC: 0.6 MG/DL
BILIRUBIN URINE: NEGATIVE
BLOOD URINE: NORMAL
BUN SERPL-MCNC: 18 MG/DL
CALCIUM SERPL-MCNC: 9.9 MG/DL
CHLORIDE SERPL-SCNC: 106 MMOL/L
CHOLEST SERPL-MCNC: 200 MG/DL
CHOLEST/HDLC SERPL: 2.9 RATIO
CO2 SERPL-SCNC: 22 MMOL/L
COLOR: YELLOW
CREAT SERPL-MCNC: 0.89 MG/DL
EOSINOPHIL # BLD AUTO: 0.14 K/UL
EOSINOPHIL NFR BLD AUTO: 2.1 %
ESTIMATED AVERAGE GLUCOSE: 103 MG/DL
GLUCOSE QUALITATIVE U: NEGATIVE
GLUCOSE SERPL-MCNC: 99 MG/DL
HBA1C MFR BLD HPLC: 5.2 %
HCT VFR BLD CALC: 41.8 %
HDLC SERPL-MCNC: 68 MG/DL
HGB BLD-MCNC: 12.6 G/DL
IMM GRANULOCYTES NFR BLD AUTO: 0.3 %
INR PPP: 2.24 RATIO
KETONES URINE: NEGATIVE
LDLC SERPL CALC-MCNC: 116 MG/DL
LEUKOCYTE ESTERASE URINE: NEGATIVE
LYMPHOCYTES # BLD AUTO: 1.45 K/UL
LYMPHOCYTES NFR BLD AUTO: 21.4 %
MAN DIFF?: NORMAL
MCHC RBC-ENTMCNC: 27.9 PG
MCHC RBC-ENTMCNC: 30.1 GM/DL
MCV RBC AUTO: 92.7 FL
MONOCYTES # BLD AUTO: 0.62 K/UL
MONOCYTES NFR BLD AUTO: 9.2 %
NEUTROPHILS # BLD AUTO: 4.47 K/UL
NEUTROPHILS NFR BLD AUTO: 66.1 %
NITRITE URINE: NEGATIVE
PH URINE: 5.5
PLATELET # BLD AUTO: 260 K/UL
POTASSIUM SERPL-SCNC: 3.9 MMOL/L
PROT SERPL-MCNC: 7.4 G/DL
PROTEIN URINE: NORMAL
PT BLD: 26 SEC
RBC # BLD: 4.51 M/UL
RBC # FLD: 14.9 %
SODIUM SERPL-SCNC: 141 MMOL/L
SPECIFIC GRAVITY URINE: 1.02
TRIGL SERPL-MCNC: 82 MG/DL
TSH SERPL-ACNC: 2.52 UIU/ML
UROBILINOGEN URINE: NORMAL
WBC # FLD AUTO: 6.76 K/UL

## 2019-06-27 NOTE — PHYSICAL EXAM
[No Rash or Lesion] : No rash or lesion [Respiratory Effort] : normal respiratory effort [Calm] : calm [Alert] : alert [JVD] : no jugular venous distention  [de-identified] : Well appearing, NAD [de-identified] : FROM [de-identified] : [facility] [de-identified] : active bleeding from spider vein in lateral calf

## 2019-06-27 NOTE — PROCEDURE
[FreeTextEntry1] : RLE US demonstrates no evidence of DVT or SVT, no deep or saphenous reflux.\par \par Two Nylon stitches were applied to close the cut on the lateral left calf and Neosporin and Xeroform were applied.

## 2019-06-27 NOTE — END OF VISIT
[FreeTextEntry3] : All medical record entries made by the Scribe were at my, Dr. Morris's, discretion and personally dictated by me on 06/24/2019 . I have reviewed the chart and agree that the record accurately reflects my personal performance of the history, physical exam, assessment and plan. I have also personally directed, reviewed and agreed to the chart.

## 2019-06-27 NOTE — HISTORY OF PRESENT ILLNESS
[FreeTextEntry1] : 69 y/o F presents today for initial evaluation of active bleeding from a spider vein located on her lateral right calf. She reports that this is the first time she's had a cut on a vein like this. Patient is on Coumadin s/p metal valve surgery about 19 years ago.

## 2019-06-27 NOTE — ADDENDUM
[FreeTextEntry1] : This note was written by Mervat So on 06/24/2019  acting as scribe for Dr. Mario.

## 2019-06-27 NOTE — ASSESSMENT
[FreeTextEntry1] : 67 y/o F with active bleeding from a spider vein located on her lateral right calf. RLE US demonstrates no evidence of DVT or SVT, no deep or saphenous reflux. Two Nylon stitches were placed to close the cut on the lateral left calf and then Neosporin and Xeroform were applied. Patient can take the Xeroform off tomorrow and should apply Neosporin to the wound daily. I informed patient that the wound may bleed or weep slightly, but this is normal. Follow up in one week for suture removal.

## 2019-07-01 ENCOUNTER — APPOINTMENT (OUTPATIENT)
Dept: VASCULAR SURGERY | Facility: CLINIC | Age: 69
End: 2019-07-01
Payer: COMMERCIAL

## 2019-07-01 PROCEDURE — 99212 OFFICE O/P EST SF 10 MIN: CPT

## 2019-07-03 NOTE — HISTORY OF PRESENT ILLNESS
[FreeTextEntry1] : bled from right lateral calf\par had the vein stitched\par no longer bleeding\par

## 2019-09-30 ENCOUNTER — LABORATORY RESULT (OUTPATIENT)
Age: 69
End: 2019-09-30

## 2019-09-30 ENCOUNTER — APPOINTMENT (OUTPATIENT)
Dept: HEART AND VASCULAR | Facility: CLINIC | Age: 69
End: 2019-09-30
Payer: MEDICARE

## 2019-09-30 VITALS
TEMPERATURE: 97.6 F | OXYGEN SATURATION: 98 % | RESPIRATION RATE: 14 BRPM | DIASTOLIC BLOOD PRESSURE: 86 MMHG | BODY MASS INDEX: 25.66 KG/M2 | SYSTOLIC BLOOD PRESSURE: 140 MMHG | WEIGHT: 154 LBS | HEIGHT: 65 IN

## 2019-09-30 DIAGNOSIS — I48.92 UNSPECIFIED ATRIAL FLUTTER: ICD-10-CM

## 2019-09-30 PROCEDURE — 99214 OFFICE O/P EST MOD 30 MIN: CPT

## 2019-09-30 PROCEDURE — 36415 COLL VENOUS BLD VENIPUNCTURE: CPT

## 2019-09-30 PROCEDURE — 93000 ELECTROCARDIOGRAM COMPLETE: CPT

## 2019-09-30 NOTE — DISCUSSION/SUMMARY
[___ Month(s)] : [unfilled] month(s) [With Me] : with me [FreeTextEntry3] : echo, crtd [FreeTextEntry1] : in nsr, on AC for mvr\par BP ok \par fluvax by md

## 2019-09-30 NOTE — PHYSICAL EXAM
[General Appearance - Well Developed] : well developed [Normal Appearance] : normal appearance [Well Groomed] : well groomed [General Appearance - Well Nourished] : well nourished [No Deformities] : no deformities [General Appearance - In No Acute Distress] : no acute distress [Normal Conjunctiva] : the conjunctiva exhibited no abnormalities [Eyelids - No Xanthelasma] : the eyelids demonstrated no xanthelasmas [Normal Oral Mucosa] : normal oral mucosa [No Oral Pallor] : no oral pallor [No Oral Cyanosis] : no oral cyanosis [Normal Jugular Venous A Waves Present] : normal jugular venous A waves present [Normal Jugular Venous V Waves Present] : normal jugular venous V waves present [No Jugular Venous Self A Waves] : no jugular venous self A waves [Respiration, Rhythm And Depth] : normal respiratory rhythm and effort [Exaggerated Use Of Accessory Muscles For Inspiration] : no accessory muscle use [Auscultation Breath Sounds / Voice Sounds] : lungs were clear to auscultation bilaterally [Abdomen Soft] : soft [Abdomen Tenderness] : non-tender [Abdomen Mass (___ Cm)] : no abdominal mass palpated [Gait - Sufficient For Exercise Testing] : the gait was sufficient for exercise testing [Abnormal Walk] : normal gait [Nail Clubbing] : no clubbing of the fingernails [Cyanosis, Localized] : no localized cyanosis [Petechial Hemorrhages (___cm)] : no petechial hemorrhages [Skin Color & Pigmentation] : normal skin color and pigmentation [] : no rash [No Venous Stasis] : no venous stasis [Skin Lesions] : no skin lesions [No Skin Ulcers] : no skin ulcer [Oriented To Time, Place, And Person] : oriented to person, place, and time [No Xanthoma] : no  xanthoma was observed [Affect] : the affect was normal [No Anxiety] : not feeling anxious [Mood] : the mood was normal [FreeTextEntry1] : metallic s1 click

## 2019-09-30 NOTE — HISTORY OF PRESENT ILLNESS
[FreeTextEntry1] : f/u of AF, MVR\par dose lowered to 5mg daily- inr's have been OK\par no further episodes\par no cp or dyspnea\par no new comps

## 2019-10-01 LAB
ALBUMIN SERPL ELPH-MCNC: 4.5 G/DL
ALP BLD-CCNC: 89 U/L
ALT SERPL-CCNC: 12 U/L
ANION GAP SERPL CALC-SCNC: 16 MMOL/L
APTT BLD: 40.4 SEC
AST SERPL-CCNC: 19 U/L
BASOPHILS # BLD AUTO: 0.07 K/UL
BASOPHILS NFR BLD AUTO: 1 %
BILIRUB SERPL-MCNC: 0.7 MG/DL
BUN SERPL-MCNC: 16 MG/DL
CALCIUM SERPL-MCNC: 10 MG/DL
CHLORIDE SERPL-SCNC: 102 MMOL/L
CHOLEST SERPL-MCNC: 211 MG/DL
CHOLEST/HDLC SERPL: 3.3 RATIO
CO2 SERPL-SCNC: 22 MMOL/L
CREAT SERPL-MCNC: 0.82 MG/DL
EOSINOPHIL # BLD AUTO: 0.18 K/UL
EOSINOPHIL NFR BLD AUTO: 2.6 %
ESTIMATED AVERAGE GLUCOSE: 100 MG/DL
GLUCOSE SERPL-MCNC: 88 MG/DL
HBA1C MFR BLD HPLC: 5.1 %
HCT VFR BLD CALC: 42 %
HDLC SERPL-MCNC: 64 MG/DL
HGB BLD-MCNC: 12.9 G/DL
IMM GRANULOCYTES NFR BLD AUTO: 0.4 %
INR PPP: 1.54 RATIO
LDLC SERPL CALC-MCNC: 130 MG/DL
LYMPHOCYTES # BLD AUTO: 1.29 K/UL
LYMPHOCYTES NFR BLD AUTO: 18.6 %
MAN DIFF?: NORMAL
MCHC RBC-ENTMCNC: 27.9 PG
MCHC RBC-ENTMCNC: 30.7 GM/DL
MCV RBC AUTO: 90.9 FL
MONOCYTES # BLD AUTO: 0.56 K/UL
MONOCYTES NFR BLD AUTO: 8.1 %
NEUTROPHILS # BLD AUTO: 4.82 K/UL
NEUTROPHILS NFR BLD AUTO: 69.3 %
PLATELET # BLD AUTO: 283 K/UL
POTASSIUM SERPL-SCNC: 4.4 MMOL/L
PROT SERPL-MCNC: 7.2 G/DL
PT BLD: 17.8 SEC
RBC # BLD: 4.62 M/UL
RBC # FLD: 14.6 %
SODIUM SERPL-SCNC: 140 MMOL/L
TRIGL SERPL-MCNC: 85 MG/DL
TSH SERPL-ACNC: 2.36 UIU/ML
WBC # FLD AUTO: 6.95 K/UL

## 2020-01-24 ENCOUNTER — APPOINTMENT (OUTPATIENT)
Dept: HEART AND VASCULAR | Facility: CLINIC | Age: 70
End: 2020-01-24

## 2020-01-24 ENCOUNTER — APPOINTMENT (OUTPATIENT)
Dept: HEART AND VASCULAR | Facility: CLINIC | Age: 70
End: 2020-01-24
Payer: MEDICARE

## 2020-01-24 VITALS
WEIGHT: 160 LBS | DIASTOLIC BLOOD PRESSURE: 80 MMHG | TEMPERATURE: 97.9 F | BODY MASS INDEX: 26.63 KG/M2 | HEART RATE: 82 BPM | SYSTOLIC BLOOD PRESSURE: 180 MMHG | OXYGEN SATURATION: 94 %

## 2020-01-24 VITALS — DIASTOLIC BLOOD PRESSURE: 80 MMHG | SYSTOLIC BLOOD PRESSURE: 130 MMHG

## 2020-01-24 LAB
INR PPP: 2.1 RATIO
POCT-PROTHROMBIN TIME: 25.7 SECS

## 2020-01-24 PROCEDURE — 99214 OFFICE O/P EST MOD 30 MIN: CPT

## 2020-01-24 NOTE — DISCUSSION/SUMMARY
[___ Month(s)] : [unfilled] month(s) [With Me] : with me [FreeTextEntry3] : labs and f/u and echo [FreeTextEntry1] : INR 2.1- will stay same\par echo and labs next appt\par BP ok by me- same rx

## 2020-03-02 ENCOUNTER — RX RENEWAL (OUTPATIENT)
Age: 70
End: 2020-03-02

## 2020-03-17 ENCOUNTER — RX RENEWAL (OUTPATIENT)
Age: 70
End: 2020-03-17

## 2020-05-07 ENCOUNTER — APPOINTMENT (OUTPATIENT)
Dept: HEART AND VASCULAR | Facility: CLINIC | Age: 70
End: 2020-05-07
Payer: MEDICARE

## 2020-05-07 DIAGNOSIS — R00.2 PALPITATIONS: ICD-10-CM

## 2020-05-07 PROCEDURE — 99214 OFFICE O/P EST MOD 30 MIN: CPT

## 2020-05-07 NOTE — HISTORY OF PRESENT ILLNESS
[Home] : at home, [unfilled] , at the time of the visit. [Medical Office: ([facility])___] : at the medical office located in  [Patient] : the patient [Self] : self [FreeTextEntry1] : palps\par lasts minutes\par occuring every night\par not lightheaded, no syncope\par no cp or dyspnea\par no new comps\par otherwise feels well

## 2020-05-08 ENCOUNTER — APPOINTMENT (OUTPATIENT)
Dept: HEART AND VASCULAR | Facility: CLINIC | Age: 70
End: 2020-05-08
Payer: MEDICARE

## 2020-05-08 VITALS
RESPIRATION RATE: 14 BRPM | SYSTOLIC BLOOD PRESSURE: 134 MMHG | HEIGHT: 65 IN | WEIGHT: 157 LBS | TEMPERATURE: 98.3 F | HEART RATE: 144 BPM | BODY MASS INDEX: 26.16 KG/M2 | OXYGEN SATURATION: 96 % | DIASTOLIC BLOOD PRESSURE: 82 MMHG

## 2020-05-08 PROCEDURE — 93000 ELECTROCARDIOGRAM COMPLETE: CPT

## 2020-05-08 PROCEDURE — 99214 OFFICE O/P EST MOD 30 MIN: CPT

## 2020-05-08 PROCEDURE — 36415 COLL VENOUS BLD VENIPUNCTURE: CPT

## 2020-05-08 NOTE — DISCUSSION/SUMMARY
[FreeTextEntry1] : AFib\par increase beta\par check inr\par will refer for dccv consideration ablation

## 2020-05-08 NOTE — HISTORY OF PRESENT ILLNESS
[FreeTextEntry1] : palps\par lasts minutes\par occuring every night\par not lightheaded, no syncope\par no cp or dyspnea\par no new comps\par otherwise feels well

## 2020-05-08 NOTE — PHYSICAL EXAM
[General Appearance - Well Developed] : well developed [Normal Appearance] : normal appearance [Well Groomed] : well groomed [General Appearance - Well Nourished] : well nourished [No Deformities] : no deformities [General Appearance - In No Acute Distress] : no acute distress [Normal Conjunctiva] : the conjunctiva exhibited no abnormalities [Eyelids - No Xanthelasma] : the eyelids demonstrated no xanthelasmas [Normal Oral Mucosa] : normal oral mucosa [No Oral Pallor] : no oral pallor [No Oral Cyanosis] : no oral cyanosis [Normal Jugular Venous A Waves Present] : normal jugular venous A waves present [Normal Jugular Venous V Waves Present] : normal jugular venous V waves present [No Jugular Venous Self A Waves] : no jugular venous self A waves [Respiration, Rhythm And Depth] : normal respiratory rhythm and effort [Exaggerated Use Of Accessory Muscles For Inspiration] : no accessory muscle use [Auscultation Breath Sounds / Voice Sounds] : lungs were clear to auscultation bilaterally [Abdomen Soft] : soft [Abdomen Tenderness] : non-tender [Abnormal Walk] : normal gait [Abdomen Mass (___ Cm)] : no abdominal mass palpated [Nail Clubbing] : no clubbing of the fingernails [Gait - Sufficient For Exercise Testing] : the gait was sufficient for exercise testing [Cyanosis, Localized] : no localized cyanosis [Petechial Hemorrhages (___cm)] : no petechial hemorrhages [FreeTextEntry1] : 3-4 cm area of edema and erythema on the michael surface of r hand [Skin Color & Pigmentation] : normal skin color and pigmentation [] : no rash [No Venous Stasis] : no venous stasis [Skin Lesions] : no skin lesions [No Skin Ulcers] : no skin ulcer [No Xanthoma] : no  xanthoma was observed [Oriented To Time, Place, And Person] : oriented to person, place, and time [Affect] : the affect was normal [Mood] : the mood was normal [No Anxiety] : not feeling anxious

## 2020-05-11 LAB
ALBUMIN SERPL ELPH-MCNC: 4.3 G/DL
ALP BLD-CCNC: 83 U/L
ALT SERPL-CCNC: 9 U/L
ANION GAP SERPL CALC-SCNC: 14 MMOL/L
APTT BLD: 49.2 SEC
AST SERPL-CCNC: 19 U/L
BASOPHILS # BLD AUTO: 0.06 K/UL
BASOPHILS NFR BLD AUTO: 1.1 %
BILIRUB SERPL-MCNC: 0.6 MG/DL
BUN SERPL-MCNC: 17 MG/DL
CALCIUM SERPL-MCNC: 10 MG/DL
CHLORIDE SERPL-SCNC: 108 MMOL/L
CHOLEST SERPL-MCNC: 199 MG/DL
CHOLEST/HDLC SERPL: 3.3 RATIO
CO2 SERPL-SCNC: 22 MMOL/L
CREAT SERPL-MCNC: 0.97 MG/DL
EOSINOPHIL # BLD AUTO: 0.15 K/UL
EOSINOPHIL NFR BLD AUTO: 2.7 %
ESTIMATED AVERAGE GLUCOSE: 105 MG/DL
GLUCOSE SERPL-MCNC: 91 MG/DL
HBA1C MFR BLD HPLC: 5.3 %
HCT VFR BLD CALC: 40.6 %
HDLC SERPL-MCNC: 61 MG/DL
HGB BLD-MCNC: 12.4 G/DL
IMM GRANULOCYTES NFR BLD AUTO: 0.4 %
INR PPP: 3.18 RATIO
LDLC SERPL CALC-MCNC: 127 MG/DL
LYMPHOCYTES # BLD AUTO: 1.31 K/UL
LYMPHOCYTES NFR BLD AUTO: 23.3 %
MAN DIFF?: NORMAL
MCHC RBC-ENTMCNC: 28 PG
MCHC RBC-ENTMCNC: 30.5 GM/DL
MCV RBC AUTO: 91.6 FL
MONOCYTES # BLD AUTO: 0.65 K/UL
MONOCYTES NFR BLD AUTO: 11.5 %
NEUTROPHILS # BLD AUTO: 3.44 K/UL
NEUTROPHILS NFR BLD AUTO: 61 %
PLATELET # BLD AUTO: 293 K/UL
POTASSIUM SERPL-SCNC: 4 MMOL/L
PROT SERPL-MCNC: 6.8 G/DL
PT BLD: 37.6 SEC
RBC # BLD: 4.43 M/UL
RBC # FLD: 14.6 %
SODIUM SERPL-SCNC: 145 MMOL/L
TRIGL SERPL-MCNC: 53 MG/DL
TSH SERPL-ACNC: 2.27 UIU/ML
WBC # FLD AUTO: 5.63 K/UL

## 2020-05-13 ENCOUNTER — APPOINTMENT (OUTPATIENT)
Dept: HEART AND VASCULAR | Facility: CLINIC | Age: 70
End: 2020-05-13
Payer: MEDICARE

## 2020-05-13 PROCEDURE — 99442: CPT | Mod: CR

## 2020-05-19 ENCOUNTER — LABORATORY RESULT (OUTPATIENT)
Age: 70
End: 2020-05-19

## 2020-05-20 ENCOUNTER — APPOINTMENT (OUTPATIENT)
Dept: HEART AND VASCULAR | Facility: CLINIC | Age: 70
End: 2020-05-20
Payer: COMMERCIAL

## 2020-05-20 PROCEDURE — 99442: CPT

## 2020-05-21 ENCOUNTER — OUTPATIENT (OUTPATIENT)
Dept: OUTPATIENT SERVICES | Facility: HOSPITAL | Age: 70
LOS: 1 days | Discharge: HOME | End: 2020-05-21
Payer: MEDICARE

## 2020-05-21 DIAGNOSIS — Z95.2 PRESENCE OF PROSTHETIC HEART VALVE: Chronic | ICD-10-CM

## 2020-05-21 PROCEDURE — 92960 CARDIOVERSION ELECTRIC EXT: CPT

## 2020-06-02 ENCOUNTER — APPOINTMENT (OUTPATIENT)
Dept: HEART AND VASCULAR | Facility: CLINIC | Age: 70
End: 2020-06-02

## 2020-06-03 DIAGNOSIS — I48.91 UNSPECIFIED ATRIAL FIBRILLATION: ICD-10-CM

## 2020-06-11 ENCOUNTER — APPOINTMENT (OUTPATIENT)
Dept: HEART AND VASCULAR | Facility: CLINIC | Age: 70
End: 2020-06-11
Payer: MEDICARE

## 2020-06-11 PROCEDURE — 99213 OFFICE O/P EST LOW 20 MIN: CPT | Mod: 95

## 2020-06-16 ENCOUNTER — APPOINTMENT (OUTPATIENT)
Dept: HEART AND VASCULAR | Facility: CLINIC | Age: 70
End: 2020-06-16
Payer: MEDICARE

## 2020-06-16 VITALS
TEMPERATURE: 97.6 F | RESPIRATION RATE: 14 BRPM | OXYGEN SATURATION: 97 % | HEIGHT: 65 IN | DIASTOLIC BLOOD PRESSURE: 86 MMHG | HEART RATE: 71 BPM | BODY MASS INDEX: 26.16 KG/M2 | WEIGHT: 157 LBS | SYSTOLIC BLOOD PRESSURE: 140 MMHG

## 2020-06-16 LAB
INR PPP: 3.2
PT BLD: 37.9

## 2020-06-16 PROCEDURE — 99214 OFFICE O/P EST MOD 30 MIN: CPT

## 2020-06-16 PROCEDURE — 93306 TTE W/DOPPLER COMPLETE: CPT

## 2020-06-16 PROCEDURE — 93000 ELECTROCARDIOGRAM COMPLETE: CPT

## 2020-06-16 NOTE — PHYSICAL EXAM
[General Appearance - Well Developed] : well developed [Normal Appearance] : normal appearance [General Appearance - Well Nourished] : well nourished [Well Groomed] : well groomed [General Appearance - In No Acute Distress] : no acute distress [No Deformities] : no deformities [Normal Conjunctiva] : the conjunctiva exhibited no abnormalities [Eyelids - No Xanthelasma] : the eyelids demonstrated no xanthelasmas [Normal Oral Mucosa] : normal oral mucosa [No Oral Cyanosis] : no oral cyanosis [Normal Jugular Venous A Waves Present] : normal jugular venous A waves present [No Oral Pallor] : no oral pallor [Normal Jugular Venous V Waves Present] : normal jugular venous V waves present [No Jugular Venous Self A Waves] : no jugular venous self A waves [Respiration, Rhythm And Depth] : normal respiratory rhythm and effort [Exaggerated Use Of Accessory Muscles For Inspiration] : no accessory muscle use [Auscultation Breath Sounds / Voice Sounds] : lungs were clear to auscultation bilaterally [Abdomen Tenderness] : non-tender [Abdomen Soft] : soft [Abnormal Walk] : normal gait [Abdomen Mass (___ Cm)] : no abdominal mass palpated [Gait - Sufficient For Exercise Testing] : the gait was sufficient for exercise testing [Nail Clubbing] : no clubbing of the fingernails [Cyanosis, Localized] : no localized cyanosis [Petechial Hemorrhages (___cm)] : no petechial hemorrhages [Skin Color & Pigmentation] : normal skin color and pigmentation [] : no rash [No Venous Stasis] : no venous stasis [No Skin Ulcers] : no skin ulcer [Skin Lesions] : no skin lesions [No Xanthoma] : no  xanthoma was observed [Affect] : the affect was normal [Oriented To Time, Place, And Person] : oriented to person, place, and time [No Anxiety] : not feeling anxious [Mood] : the mood was normal [FreeTextEntry1] : 3-4 cm area of edema and erythema on the michael surface of r hand

## 2020-06-16 NOTE — HISTORY OF PRESENT ILLNESS
[FreeTextEntry1] : had successful dccv\par no sig palps\par no cp or dyspnea\par no new comps\par otherwise feels well

## 2020-07-06 ENCOUNTER — APPOINTMENT (OUTPATIENT)
Dept: HEART AND VASCULAR | Facility: CLINIC | Age: 70
End: 2020-07-06

## 2020-08-10 NOTE — ED PROVIDER NOTE - SKIN, MLM
Skin normal color for race, warm, dry and intact. No evidence of rash. Griseofulvin Counseling:  I discussed with the patient the risks of griseofulvin including but not limited to photosensitivity, cytopenia, liver damage, nausea/vomiting and severe allergy.  The patient understands that this medication is best absorbed when taken with a fatty meal (e.g., ice cream or french fries).

## 2020-08-18 ENCOUNTER — APPOINTMENT (OUTPATIENT)
Dept: HEART AND VASCULAR | Facility: CLINIC | Age: 70
End: 2020-08-18
Payer: MEDICARE

## 2020-08-18 VITALS
WEIGHT: 162 LBS | RESPIRATION RATE: 14 BRPM | BODY MASS INDEX: 26.99 KG/M2 | HEIGHT: 65 IN | SYSTOLIC BLOOD PRESSURE: 150 MMHG | TEMPERATURE: 97.6 F | DIASTOLIC BLOOD PRESSURE: 86 MMHG | OXYGEN SATURATION: 97 % | HEART RATE: 74 BPM

## 2020-08-18 LAB
INR PPP: 3.8
PT BLD: 45.3

## 2020-08-18 PROCEDURE — 99214 OFFICE O/P EST MOD 30 MIN: CPT

## 2020-08-18 PROCEDURE — 93000 ELECTROCARDIOGRAM COMPLETE: CPT

## 2020-08-18 NOTE — DISCUSSION/SUMMARY
[FreeTextEntry1] : AF w rvr\par increase toprol to bid\par will see EP for dccv this week\par INR 3.8 today- hold 1 day then same

## 2020-08-18 NOTE — PHYSICAL EXAM
[General Appearance - Well Developed] : well developed [Normal Appearance] : normal appearance [Well Groomed] : well groomed [General Appearance - Well Nourished] : well nourished [No Deformities] : no deformities [General Appearance - In No Acute Distress] : no acute distress [Normal Conjunctiva] : the conjunctiva exhibited no abnormalities [Eyelids - No Xanthelasma] : the eyelids demonstrated no xanthelasmas [No Oral Pallor] : no oral pallor [Normal Oral Mucosa] : normal oral mucosa [No Oral Cyanosis] : no oral cyanosis [Normal Jugular Venous V Waves Present] : normal jugular venous V waves present [No Jugular Venous Self A Waves] : no jugular venous self A waves [Normal Jugular Venous A Waves Present] : normal jugular venous A waves present [Respiration, Rhythm And Depth] : normal respiratory rhythm and effort [Exaggerated Use Of Accessory Muscles For Inspiration] : no accessory muscle use [Auscultation Breath Sounds / Voice Sounds] : lungs were clear to auscultation bilaterally [Abdomen Soft] : soft [Abdomen Tenderness] : non-tender [Abdomen Mass (___ Cm)] : no abdominal mass palpated [Abnormal Walk] : normal gait [Gait - Sufficient For Exercise Testing] : the gait was sufficient for exercise testing [Nail Clubbing] : no clubbing of the fingernails [Cyanosis, Localized] : no localized cyanosis [Petechial Hemorrhages (___cm)] : no petechial hemorrhages [Skin Color & Pigmentation] : normal skin color and pigmentation [] : no rash [No Venous Stasis] : no venous stasis [Skin Lesions] : no skin lesions [No Skin Ulcers] : no skin ulcer [No Xanthoma] : no  xanthoma was observed [Oriented To Time, Place, And Person] : oriented to person, place, and time [Affect] : the affect was normal [Mood] : the mood was normal [No Anxiety] : not feeling anxious [FreeTextEntry1] : 3-4 cm area of edema and erythema on the michael surface of r hand

## 2020-08-18 NOTE — HISTORY OF PRESENT ILLNESS
[FreeTextEntry1] : palps for 3-4 days\par no cp or dyspnea\par not lightheaded, no syncope\par no edema or orthop

## 2020-08-20 LAB — SARS-COV-2 N GENE NPH QL NAA+PROBE: NOT DETECTED

## 2020-08-21 ENCOUNTER — OUTPATIENT (OUTPATIENT)
Dept: OUTPATIENT SERVICES | Facility: HOSPITAL | Age: 70
LOS: 1 days | Discharge: HOME | End: 2020-08-21
Payer: MEDICARE

## 2020-08-21 ENCOUNTER — APPOINTMENT (OUTPATIENT)
Dept: HEART AND VASCULAR | Facility: CLINIC | Age: 70
End: 2020-08-21

## 2020-08-21 DIAGNOSIS — Z95.2 PRESENCE OF PROSTHETIC HEART VALVE: Chronic | ICD-10-CM

## 2020-08-21 LAB
APTT BLD: 41.9 SEC — HIGH (ref 27.5–35.5)
INR BLD: 1.74 — HIGH (ref 0.88–1.16)
PROTHROM AB SERPL-ACNC: 20.3 SEC — HIGH (ref 10.6–13.6)

## 2020-08-21 PROCEDURE — 92960 CARDIOVERSION ELECTRIC EXT: CPT

## 2020-08-21 PROCEDURE — 85610 PROTHROMBIN TIME: CPT

## 2020-08-21 PROCEDURE — 36415 COLL VENOUS BLD VENIPUNCTURE: CPT

## 2020-08-21 PROCEDURE — 85730 THROMBOPLASTIN TIME PARTIAL: CPT

## 2020-08-21 RX ORDER — ENOXAPARIN SODIUM 100 MG/ML
70 INJECTION SUBCUTANEOUS ONCE
Refills: 0 | Status: COMPLETED | OUTPATIENT
Start: 2020-08-21 | End: 2020-08-21

## 2020-08-21 RX ADMIN — ENOXAPARIN SODIUM 70 MILLIGRAM(S): 100 INJECTION SUBCUTANEOUS at 12:30

## 2020-08-24 ENCOUNTER — APPOINTMENT (OUTPATIENT)
Dept: HEART AND VASCULAR | Facility: CLINIC | Age: 70
End: 2020-08-24
Payer: MEDICARE

## 2020-08-24 ENCOUNTER — RX RENEWAL (OUTPATIENT)
Age: 70
End: 2020-08-24

## 2020-08-24 ENCOUNTER — MED ADMIN CHARGE (OUTPATIENT)
Age: 70
End: 2020-08-24

## 2020-08-24 VITALS
SYSTOLIC BLOOD PRESSURE: 146 MMHG | DIASTOLIC BLOOD PRESSURE: 84 MMHG | TEMPERATURE: 97.5 F | HEIGHT: 65 IN | RESPIRATION RATE: 14 BRPM | HEART RATE: 72 BPM | BODY MASS INDEX: 26.99 KG/M2 | OXYGEN SATURATION: 97 % | WEIGHT: 162 LBS

## 2020-08-24 DIAGNOSIS — Z23 ENCOUNTER FOR IMMUNIZATION: ICD-10-CM

## 2020-08-24 LAB
INR PPP: 2.8
PT BLD: 33.1

## 2020-08-24 PROCEDURE — 99213 OFFICE O/P EST LOW 20 MIN: CPT

## 2020-08-24 NOTE — HISTORY OF PRESENT ILLNESS
[FreeTextEntry1] : s/p dccv\par considering RFA\par no cp or dyspnea\par not lightheaded, no syncope\par no edema or orthop

## 2020-08-24 NOTE — PHYSICAL EXAM
[Normal Appearance] : normal appearance [General Appearance - Well Developed] : well developed [General Appearance - Well Nourished] : well nourished [Well Groomed] : well groomed [General Appearance - In No Acute Distress] : no acute distress [No Deformities] : no deformities [Normal Conjunctiva] : the conjunctiva exhibited no abnormalities [Eyelids - No Xanthelasma] : the eyelids demonstrated no xanthelasmas [Normal Oral Mucosa] : normal oral mucosa [No Oral Pallor] : no oral pallor [No Oral Cyanosis] : no oral cyanosis [Normal Jugular Venous A Waves Present] : normal jugular venous A waves present [No Jugular Venous Self A Waves] : no jugular venous self A waves [Normal Jugular Venous V Waves Present] : normal jugular venous V waves present [Respiration, Rhythm And Depth] : normal respiratory rhythm and effort [Exaggerated Use Of Accessory Muscles For Inspiration] : no accessory muscle use [Auscultation Breath Sounds / Voice Sounds] : lungs were clear to auscultation bilaterally [Abdomen Soft] : soft [Abdomen Tenderness] : non-tender [Abnormal Walk] : normal gait [Abdomen Mass (___ Cm)] : no abdominal mass palpated [Gait - Sufficient For Exercise Testing] : the gait was sufficient for exercise testing [Nail Clubbing] : no clubbing of the fingernails [Cyanosis, Localized] : no localized cyanosis [Petechial Hemorrhages (___cm)] : no petechial hemorrhages [FreeTextEntry1] : 3-4 cm area of edema and erythema on the michael surface of r hand [] : no rash [Skin Color & Pigmentation] : normal skin color and pigmentation [Skin Lesions] : no skin lesions [No Venous Stasis] : no venous stasis [No Skin Ulcers] : no skin ulcer [No Xanthoma] : no  xanthoma was observed [Affect] : the affect was normal [Oriented To Time, Place, And Person] : oriented to person, place, and time [No Anxiety] : not feeling anxious [Mood] : the mood was normal

## 2020-08-24 NOTE — DISCUSSION/SUMMARY
[FreeTextEntry1] : INR 2.8- return to normal dosing coumadin\par stop lovenox\par see me back in 2-3 weeks\par in nsr clinically

## 2020-09-18 ENCOUNTER — APPOINTMENT (OUTPATIENT)
Dept: HEART AND VASCULAR | Facility: CLINIC | Age: 70
End: 2020-09-18
Payer: MEDICARE

## 2020-09-18 VITALS
WEIGHT: 164 LBS | OXYGEN SATURATION: 98 % | HEART RATE: 70 BPM | RESPIRATION RATE: 14 BRPM | BODY MASS INDEX: 27.32 KG/M2 | TEMPERATURE: 97.6 F | HEIGHT: 65 IN | DIASTOLIC BLOOD PRESSURE: 82 MMHG | SYSTOLIC BLOOD PRESSURE: 144 MMHG

## 2020-09-18 LAB
INR PPP: 3.4
PT BLD: 41.4

## 2020-09-18 PROCEDURE — 99214 OFFICE O/P EST MOD 30 MIN: CPT

## 2020-09-18 NOTE — HISTORY OF PRESENT ILLNESS
[FreeTextEntry1] : having occ palps- nothing sustained\par considering RFA\par no cp or dyspnea\par not lightheaded, no syncope\par no edema or orthop

## 2020-10-08 ENCOUNTER — NON-APPOINTMENT (OUTPATIENT)
Age: 70
End: 2020-10-08

## 2020-10-08 ENCOUNTER — APPOINTMENT (OUTPATIENT)
Dept: HEART AND VASCULAR | Facility: CLINIC | Age: 70
End: 2020-10-08
Payer: MEDICARE

## 2020-10-08 VITALS — HEIGHT: 65 IN | HEART RATE: 76 BPM | WEIGHT: 155 LBS | BODY MASS INDEX: 25.83 KG/M2 | TEMPERATURE: 98 F

## 2020-10-08 VITALS — SYSTOLIC BLOOD PRESSURE: 189 MMHG | DIASTOLIC BLOOD PRESSURE: 86 MMHG

## 2020-10-08 PROCEDURE — 93000 ELECTROCARDIOGRAM COMPLETE: CPT

## 2020-10-08 PROCEDURE — 99215 OFFICE O/P EST HI 40 MIN: CPT | Mod: 25

## 2020-10-08 NOTE — PHYSICAL EXAM
[General Appearance - Well Developed] : well developed [Normal Appearance] : normal appearance [Well Groomed] : well groomed [General Appearance - Well Nourished] : well nourished [No Deformities] : no deformities [General Appearance - In No Acute Distress] : no acute distress [Normal Conjunctiva] : the conjunctiva exhibited no abnormalities [Normal Oral Mucosa] : normal oral mucosa [Normal Jugular Venous V Waves Present] : normal jugular venous V waves present [] : no respiratory distress [Respiration, Rhythm And Depth] : normal respiratory rhythm and effort [Exaggerated Use Of Accessory Muscles For Inspiration] : no accessory muscle use [Auscultation Breath Sounds / Voice Sounds] : lungs were clear to auscultation bilaterally [Heart Rate And Rhythm] : heart rate and rhythm were normal [Heart Sounds] : normal S1 and S2 [Abnormal Walk] : normal gait [Cyanosis, Localized] : no localized cyanosis [Skin Color & Pigmentation] : normal skin color and pigmentation [Oriented To Time, Place, And Person] : oriented to person, place, and time [Impaired Insight] : insight and judgment were intact [Affect] : the affect was normal [Mood] : the mood was normal

## 2020-10-12 NOTE — HISTORY OF PRESENT ILLNESS
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 Echo showed EF of 50-55% and moderate pHTN.  She had recurrent atrial fibrillation 5/2020 s/p cardioversion and again 8/2020 s/p cardioversion.  She has been resistant to an ablation.  Since her last cardioversion she is doing well.  She is anxious but no palpitations, SOB, syncope, near syncope or edema.  She is compliant with Eliquis.   \par \par

## 2020-10-12 NOTE — DISCUSSION/SUMMARY
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  WE discussed what atrial fibrillation is, the natural progression of this arrhythmia and the association with stroke.  I think she would ultimately benefit from an ablation as this is her best chance for long term arrhythmia suppression.  Alternatively we can continue with observation or an antiarrhythmic medication.  She will consider an ablation next year but will start Multaq 400BID now.  She will continue oral anticoagulation.  She will follow up in 4 months or sooner if needed and knows to call with any questions or concerns.

## 2020-10-19 ENCOUNTER — RX RENEWAL (OUTPATIENT)
Age: 70
End: 2020-10-19

## 2020-11-02 ENCOUNTER — APPOINTMENT (OUTPATIENT)
Dept: HEART AND VASCULAR | Facility: CLINIC | Age: 70
End: 2020-11-02
Payer: MEDICARE

## 2020-11-02 VITALS
DIASTOLIC BLOOD PRESSURE: 82 MMHG | SYSTOLIC BLOOD PRESSURE: 160 MMHG | HEIGHT: 65 IN | WEIGHT: 156 LBS | OXYGEN SATURATION: 98 % | BODY MASS INDEX: 25.99 KG/M2 | HEART RATE: 77 BPM | TEMPERATURE: 97.8 F | RESPIRATION RATE: 14 BRPM

## 2020-11-02 LAB
INR PPP: 3.2
PT BLD: 38.8

## 2020-11-02 PROCEDURE — 99214 OFFICE O/P EST MOD 30 MIN: CPT

## 2020-11-02 NOTE — HISTORY OF PRESENT ILLNESS
[FreeTextEntry1] : having occ palps- nothing sustained\par considering RFA may do it later this month\par no cp or dyspnea\par not lightheaded, no syncope\par no edema or orthop

## 2020-11-02 NOTE — DISCUSSION/SUMMARY
[FreeTextEntry1] : AF, will likely proceed w rfa later this month\par inr 3.2- same dose\par /90- may need additional rx\par f/u 1-2 months\par

## 2020-11-11 ENCOUNTER — NON-APPOINTMENT (OUTPATIENT)
Age: 70
End: 2020-11-11

## 2020-11-11 ENCOUNTER — APPOINTMENT (OUTPATIENT)
Dept: HEART AND VASCULAR | Facility: CLINIC | Age: 70
End: 2020-11-11
Payer: MEDICARE

## 2020-11-11 VITALS
DIASTOLIC BLOOD PRESSURE: 85 MMHG | TEMPERATURE: 98.1 F | HEART RATE: 78 BPM | HEIGHT: 65 IN | WEIGHT: 156 LBS | BODY MASS INDEX: 25.99 KG/M2 | SYSTOLIC BLOOD PRESSURE: 170 MMHG

## 2020-11-11 PROCEDURE — 93000 ELECTROCARDIOGRAM COMPLETE: CPT

## 2020-11-11 PROCEDURE — 99215 OFFICE O/P EST HI 40 MIN: CPT | Mod: 25

## 2020-11-11 NOTE — PHYSICAL EXAM
[General Appearance - Well Developed] : well developed [Normal Appearance] : normal appearance [Well Groomed] : well groomed [General Appearance - Well Nourished] : well nourished [No Deformities] : no deformities [General Appearance - In No Acute Distress] : no acute distress [Normal Conjunctiva] : the conjunctiva exhibited no abnormalities [Normal Oral Mucosa] : normal oral mucosa [Normal Jugular Venous V Waves Present] : normal jugular venous V waves present [] : no respiratory distress [Respiration, Rhythm And Depth] : normal respiratory rhythm and effort [Exaggerated Use Of Accessory Muscles For Inspiration] : no accessory muscle use [Auscultation Breath Sounds / Voice Sounds] : lungs were clear to auscultation bilaterally [Heart Rate And Rhythm] : heart rate and rhythm were normal [Heart Sounds] : normal S1 and S2 [Abnormal Walk] : normal gait [Skin Color & Pigmentation] : normal skin color and pigmentation [Oriented To Time, Place, And Person] : oriented to person, place, and time [Impaired Insight] : insight and judgment were intact [Affect] : the affect was normal [Mood] : the mood was normal

## 2020-11-19 DIAGNOSIS — Z01.818 ENCOUNTER FOR OTHER PREPROCEDURAL EXAMINATION: ICD-10-CM

## 2020-11-20 ENCOUNTER — RX RENEWAL (OUTPATIENT)
Age: 70
End: 2020-11-20

## 2020-11-20 ENCOUNTER — APPOINTMENT (OUTPATIENT)
Dept: DISASTER EMERGENCY | Facility: CLINIC | Age: 70
End: 2020-11-20

## 2020-11-22 ENCOUNTER — RX RENEWAL (OUTPATIENT)
Age: 70
End: 2020-11-22

## 2020-11-22 LAB — SARS-COV-2 N GENE NPH QL NAA+PROBE: NOT DETECTED

## 2020-11-23 ENCOUNTER — OUTPATIENT (OUTPATIENT)
Dept: OUTPATIENT SERVICES | Facility: HOSPITAL | Age: 70
LOS: 1 days | Discharge: HOME | End: 2020-11-23
Payer: MEDICARE

## 2020-11-23 VITALS
HEART RATE: 65 BPM | OXYGEN SATURATION: 100 % | SYSTOLIC BLOOD PRESSURE: 136 MMHG | RESPIRATION RATE: 16 BRPM | DIASTOLIC BLOOD PRESSURE: 87 MMHG

## 2020-11-23 DIAGNOSIS — Z95.2 PRESENCE OF PROSTHETIC HEART VALVE: Chronic | ICD-10-CM

## 2020-11-23 DIAGNOSIS — Z95.2 PRESENCE OF PROSTHETIC HEART VALVE: ICD-10-CM

## 2020-11-23 DIAGNOSIS — I48.91 UNSPECIFIED ATRIAL FIBRILLATION: ICD-10-CM

## 2020-11-23 DIAGNOSIS — I10 ESSENTIAL (PRIMARY) HYPERTENSION: ICD-10-CM

## 2020-11-23 LAB
ANION GAP SERPL CALC-SCNC: 15 MMOL/L — SIGNIFICANT CHANGE UP (ref 5–17)
APTT BLD: 51.3 SEC — HIGH (ref 27.5–35.5)
BLD GP AB SCN SERPL QL: POSITIVE — SIGNIFICANT CHANGE UP
BUN SERPL-MCNC: 15 MG/DL — SIGNIFICANT CHANGE UP (ref 7–23)
CALCIUM SERPL-MCNC: 9.9 MG/DL — SIGNIFICANT CHANGE UP (ref 8.4–10.5)
CHLORIDE SERPL-SCNC: 104 MMOL/L — SIGNIFICANT CHANGE UP (ref 96–108)
CO2 SERPL-SCNC: 23 MMOL/L — SIGNIFICANT CHANGE UP (ref 22–31)
CREAT SERPL-MCNC: 0.89 MG/DL — SIGNIFICANT CHANGE UP (ref 0.5–1.3)
GLUCOSE SERPL-MCNC: 86 MG/DL — SIGNIFICANT CHANGE UP (ref 70–99)
HCT VFR BLD CALC: 43.1 % — SIGNIFICANT CHANGE UP (ref 34.5–45)
HGB BLD-MCNC: 13.3 G/DL — SIGNIFICANT CHANGE UP (ref 11.5–15.5)
INR BLD: 2.89 — HIGH (ref 0.88–1.16)
MAGNESIUM SERPL-MCNC: 2 MG/DL — SIGNIFICANT CHANGE UP (ref 1.6–2.6)
MCHC RBC-ENTMCNC: 27.5 PG — SIGNIFICANT CHANGE UP (ref 27–34)
MCHC RBC-ENTMCNC: 30.9 GM/DL — LOW (ref 32–36)
MCV RBC AUTO: 89 FL — SIGNIFICANT CHANGE UP (ref 80–100)
NRBC # BLD: 0 /100 WBCS — SIGNIFICANT CHANGE UP (ref 0–0)
PLATELET # BLD AUTO: 214 K/UL — SIGNIFICANT CHANGE UP (ref 150–400)
POTASSIUM SERPL-MCNC: 3.6 MMOL/L — SIGNIFICANT CHANGE UP (ref 3.5–5.3)
POTASSIUM SERPL-SCNC: 3.6 MMOL/L — SIGNIFICANT CHANGE UP (ref 3.5–5.3)
PROTHROM AB SERPL-ACNC: 33 SEC — HIGH (ref 10.6–13.6)
RBC # BLD: 4.84 M/UL — SIGNIFICANT CHANGE UP (ref 3.8–5.2)
RBC # FLD: 13.9 % — SIGNIFICANT CHANGE UP (ref 10.3–14.5)
RH IG SCN BLD-IMP: POSITIVE — SIGNIFICANT CHANGE UP
SODIUM SERPL-SCNC: 142 MMOL/L — SIGNIFICANT CHANGE UP (ref 135–145)
WBC # BLD: 6.82 K/UL — SIGNIFICANT CHANGE UP (ref 3.8–10.5)
WBC # FLD AUTO: 6.82 K/UL — SIGNIFICANT CHANGE UP (ref 3.8–10.5)

## 2020-11-23 PROCEDURE — 93613 INTRACARDIAC EPHYS 3D MAPG: CPT

## 2020-11-23 PROCEDURE — 93655 ICAR CATH ABLTJ DSCRT ARRHYT: CPT

## 2020-11-23 PROCEDURE — 93623 PRGRMD STIMJ&PACG IV RX NFS: CPT | Mod: 26

## 2020-11-23 PROCEDURE — 86077 PHYS BLOOD BANK SERV XMATCH: CPT

## 2020-11-23 PROCEDURE — 93656 COMPRE EP EVAL ABLTJ ATR FIB: CPT

## 2020-11-23 PROCEDURE — 93662 INTRACARDIAC ECG (ICE): CPT | Mod: 26

## 2020-11-23 RX ORDER — COLCHICINE 0.6 MG
0.6 TABLET ORAL
Refills: 0 | Status: DISCONTINUED | OUTPATIENT
Start: 2020-11-23 | End: 2020-11-23

## 2020-11-23 RX ORDER — METOPROLOL TARTRATE 50 MG
25 TABLET ORAL DAILY
Refills: 0 | Status: DISCONTINUED | OUTPATIENT
Start: 2020-11-23 | End: 2020-11-26

## 2020-11-23 RX ORDER — COLCHICINE 0.6 MG
0.6 TABLET ORAL
Refills: 0 | Status: DISCONTINUED | OUTPATIENT
Start: 2020-11-23 | End: 2020-11-26

## 2020-11-23 RX ORDER — PANTOPRAZOLE SODIUM 20 MG/1
40 TABLET, DELAYED RELEASE ORAL
Refills: 0 | Status: DISCONTINUED | OUTPATIENT
Start: 2020-11-23 | End: 2020-11-26

## 2020-11-23 RX ORDER — WARFARIN SODIUM 2.5 MG/1
5 TABLET ORAL ONCE
Refills: 0 | Status: COMPLETED | OUTPATIENT
Start: 2020-11-23 | End: 2020-11-23

## 2020-11-23 RX ORDER — LOSARTAN POTASSIUM 100 MG/1
100 TABLET, FILM COATED ORAL DAILY
Refills: 0 | Status: DISCONTINUED | OUTPATIENT
Start: 2020-11-23 | End: 2020-11-26

## 2020-11-23 RX ADMIN — WARFARIN SODIUM 5 MILLIGRAM(S): 2.5 TABLET ORAL at 21:03

## 2020-11-23 RX ADMIN — Medication 25 MILLIGRAM(S): at 17:41

## 2020-11-23 RX ADMIN — LOSARTAN POTASSIUM 100 MILLIGRAM(S): 100 TABLET, FILM COATED ORAL at 16:01

## 2020-11-23 RX ADMIN — Medication 0.6 MILLIGRAM(S): at 17:41

## 2020-11-23 RX ADMIN — PANTOPRAZOLE SODIUM 40 MILLIGRAM(S): 20 TABLET, DELAYED RELEASE ORAL at 21:03

## 2020-11-23 NOTE — H&P ADULT - HISTORY OF PRESENT ILLNESS
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    She was admitted to [facility] 9/2018 with palpitations in aflutter with a ventricular rate up to 140 bpm and underwent a cardioversion. Echo showed EF of 50-55% and moderate pHTN.  She had recurrent atrial fibrillation 5/2020 s/p cardioversion and again 8/2020 s/p cardioversion.  She has been resistant to an ablation.  Since her last cardioversion she is doing well.  She is anxious but no palpitations, SOB, syncope, near syncope or edema.  She is compliant with Eliquis.   She notes an improvement in the way she feels in NSR.  She never started Multaq as she is ready to proceed with an ablation.    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    She was admitted to [facility] 9/2018 with palpitations in aflutter with a ventricular rate up to 140 bpm and underwent a cardioversion. Echo showed EF of 50-55% and moderate pHTN.  She had recurrent atrial fibrillation 5/2020 s/p cardioversion and again 8/2020 s/p cardioversion.  Consideration was given to start Multaq but she decided to proceed with ablation.  She is anxious but denies any palpitations, SOB, syncope, near syncope or edema.  She is compliant with Eliquis.

## 2020-11-23 NOTE — H&P ADULT - EXTREMITIES
07/05/19 2017   Patient Assessment/Suction   Level of Consciousness (AVPU) alert   PRE-TX-O2   O2 Device (Oxygen Therapy) room air   SpO2 95 %   Pulse Oximetry Type Intermittent   Pulse 70   Resp 16      No cyanosis, clubbing or edema

## 2020-11-23 NOTE — DISCUSSION/SUMMARY
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  We again discussed what atrial fibrillation is, the natural progression of this arrhythmia and the association with stroke.  I think she would ultimately benefit from an ablation as this is her best chance for long term arrhythmia suppression and she is now eager to book this.  We discussed the procedure in detail including risks, benefits and alternatives.   I have quoted her an approximately 70% chance for success and a 1:700 chance of major complication related to the procedure.  Risks including, but not limited to; infection, vascular injury, cardiac perforation, TE/CVA, phrenic nerve injury were discussed.  All questions answered.  She has not started Multaq but has an RX which we discussed she may need during the blanking period after her procedure.   She will continue oral anticoagulation.  She  knows to call with any questions or concerns.

## 2020-11-23 NOTE — H&P ADULT - ASSESSMENT
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    She was admitted to [facility] 9/2018 with palpitations in aflutter with a ventricular rate up to 140 bpm and underwent a cardioversion. Echo showed EF of 50-55% and moderate pHTN.  She had recurrent atrial fibrillation 5/2020 s/p cardioversion and again 8/2020 s/p cardioversion.  Consideration was given to start Multaq but she decided to proceed with ablation.  She is anxious but denies any palpitations, SOB, syncope, near syncope or edema.  She is compliant with Eliquis.

## 2020-11-23 NOTE — H&P ADULT - NSICDXPASTMEDICALHX_GEN_ALL_CORE_FT
PAST MEDICAL HISTORY:  Atrial fibrillation     Atrial flutter with rapid ventricular response     Heart valve replaced     HTN (hypertension)

## 2020-11-23 NOTE — PROGRESS NOTE ADULT - SUBJECTIVE AND OBJECTIVE BOX
SYLWIA WISE  [id number]    PROCEDURE:  intra-cardiac echo (ICE)  EP study  trans-septal puncture  3D anatomical mapping  PVI with RF ablation  cardioversion   ablation of posterior wall   ablation of doretha-mitral flutter both endocardially and epicardially from the CS  CTI ablation       INDICATION:  Atrial fibrillation  doretha-mitral atrial flutter       ELECTROPHYSIOLOGIST(S):  Dr. Teresa  Fellow javier [facility]      ANESTHESIOLOGY:  GA      FINDINGS:  - PVI by RF ablation  - after isolating the left PVs, patient was in atypical flutter, entrainment diagnostic of doretha-mitral flutter, hence posterior ablation line connecting posterolateral mitral isthmus to LIPV was performed. Further lesions were delivered epicardially from the CS. Block across mitral isthmus confirmed.   - The Right PVs were isolated. The jose of the Right veins was isolated.  - The posterior wall was isolated.  - synchronized cardioversion with 200 joules successfully converted patient back into sinus rhythm  - Adenosine was given and there was no reconnection of Left PVs    - CTI line performed  - ICE confirmed no pericardial effusion   - Rt. and Lt. femoral veins hemostasis achieved with VASCADE closure device and manual pressure      COMPLICATIONS:  none      RECOMMENDATIONS:  - resume Coumadin tonight given her mechanical mitral valve  - monitor groins for any bleeding/hematoma  - call EP with any Qs.

## 2020-11-23 NOTE — REVIEW OF SYSTEMS
[see HPI] : see HPI [Negative] : Gastrointestinal [Fever] : no fever [Recent Weight Gain (___ Lbs)] : no recent weight gain [Chills] : no chills [Feeling Fatigued] : not feeling fatigued [Recent Weight Loss (___ Lbs)] : no recent weight loss

## 2020-11-24 PROBLEM — I48.91 UNSPECIFIED ATRIAL FIBRILLATION: Chronic | Status: ACTIVE | Noted: 2020-11-23

## 2020-11-24 LAB
ALBUMIN SERPL ELPH-MCNC: 3.4 G/DL — SIGNIFICANT CHANGE UP (ref 3.3–5)
ALP SERPL-CCNC: 70 U/L — SIGNIFICANT CHANGE UP (ref 40–120)
ALT FLD-CCNC: 8 U/L — LOW (ref 10–45)
ANION GAP SERPL CALC-SCNC: 10 MMOL/L — SIGNIFICANT CHANGE UP (ref 5–17)
APPEARANCE UR: CLEAR — SIGNIFICANT CHANGE UP
APTT BLD: 49.4 SEC — HIGH (ref 27.5–35.5)
APTT BLD: 49.6 SEC — HIGH (ref 27.5–35.5)
AST SERPL-CCNC: 41 U/L — HIGH (ref 10–40)
BACTERIA # UR AUTO: PRESENT /HPF
BILIRUB DIRECT SERPL-MCNC: <0.2 MG/DL — SIGNIFICANT CHANGE UP (ref 0–0.2)
BILIRUB INDIRECT FLD-MCNC: SIGNIFICANT CHANGE UP MG/DL (ref 0.2–1)
BILIRUB SERPL-MCNC: 0.3 MG/DL — SIGNIFICANT CHANGE UP (ref 0.2–1.2)
BILIRUB UR-MCNC: NEGATIVE — SIGNIFICANT CHANGE UP
BUN SERPL-MCNC: 19 MG/DL — SIGNIFICANT CHANGE UP (ref 7–23)
CALCIUM SERPL-MCNC: 9.3 MG/DL — SIGNIFICANT CHANGE UP (ref 8.4–10.5)
CHLORIDE SERPL-SCNC: 108 MMOL/L — SIGNIFICANT CHANGE UP (ref 96–108)
CO2 SERPL-SCNC: 21 MMOL/L — LOW (ref 22–31)
COLOR SPEC: YELLOW — SIGNIFICANT CHANGE UP
CREAT SERPL-MCNC: 1.04 MG/DL — SIGNIFICANT CHANGE UP (ref 0.5–1.3)
DIFF PNL FLD: ABNORMAL
EPI CELLS # UR: SIGNIFICANT CHANGE UP /HPF (ref 0–5)
GLUCOSE SERPL-MCNC: 124 MG/DL — HIGH (ref 70–99)
GLUCOSE UR QL: NEGATIVE — SIGNIFICANT CHANGE UP
HCT VFR BLD CALC: 35 % — SIGNIFICANT CHANGE UP (ref 34.5–45)
HGB BLD-MCNC: 11 G/DL — LOW (ref 11.5–15.5)
HYALINE CASTS # UR AUTO: SIGNIFICANT CHANGE UP /LPF (ref 0–2)
INR BLD: 5.52 — CRITICAL HIGH (ref 0.88–1.16)
INR BLD: 7.01 — CRITICAL HIGH (ref 0.88–1.16)
INR BLD: 7.93 — CRITICAL HIGH (ref 0.88–1.16)
KETONES UR-MCNC: 15 MG/DL
LEUKOCYTE ESTERASE UR-ACNC: NEGATIVE — SIGNIFICANT CHANGE UP
MCHC RBC-ENTMCNC: 27.9 PG — SIGNIFICANT CHANGE UP (ref 27–34)
MCHC RBC-ENTMCNC: 31.4 GM/DL — LOW (ref 32–36)
MCV RBC AUTO: 88.8 FL — SIGNIFICANT CHANGE UP (ref 80–100)
NITRITE UR-MCNC: POSITIVE
NRBC # BLD: 0 /100 WBCS — SIGNIFICANT CHANGE UP (ref 0–0)
PH UR: 5.5 — SIGNIFICANT CHANGE UP (ref 5–8)
PLATELET # BLD AUTO: 198 K/UL — SIGNIFICANT CHANGE UP (ref 150–400)
POTASSIUM SERPL-MCNC: 4 MMOL/L — SIGNIFICANT CHANGE UP (ref 3.5–5.3)
POTASSIUM SERPL-SCNC: 4 MMOL/L — SIGNIFICANT CHANGE UP (ref 3.5–5.3)
PROT SERPL-MCNC: 6 G/DL — SIGNIFICANT CHANGE UP (ref 6–8.3)
PROT UR-MCNC: 100 MG/DL
PROTHROM AB SERPL-ACNC: 61.1 SEC — HIGH (ref 10.6–13.6)
PROTHROM AB SERPL-ACNC: 76.7 SEC — HIGH (ref 10.6–13.6)
PROTHROM AB SERPL-ACNC: 86.2 SEC — HIGH (ref 10.6–13.6)
RBC # BLD: 3.94 M/UL — SIGNIFICANT CHANGE UP (ref 3.8–5.2)
RBC # FLD: 14.2 % — SIGNIFICANT CHANGE UP (ref 10.3–14.5)
RBC CASTS # UR COMP ASSIST: ABNORMAL /HPF
SODIUM SERPL-SCNC: 139 MMOL/L — SIGNIFICANT CHANGE UP (ref 135–145)
SP GR SPEC: 1.02 — SIGNIFICANT CHANGE UP (ref 1–1.03)
UROBILINOGEN FLD QL: 0.2 E.U./DL — SIGNIFICANT CHANGE UP
WBC # BLD: 12.63 K/UL — HIGH (ref 3.8–10.5)
WBC # FLD AUTO: 12.63 K/UL — HIGH (ref 3.8–10.5)
WBC UR QL: ABNORMAL /HPF

## 2020-11-24 PROCEDURE — 93010 ELECTROCARDIOGRAM REPORT: CPT

## 2020-11-24 RX ORDER — AMLODIPINE BESYLATE 2.5 MG/1
5 TABLET ORAL ONCE
Refills: 0 | Status: COMPLETED | OUTPATIENT
Start: 2020-11-24 | End: 2020-11-24

## 2020-11-24 RX ORDER — SOTALOL HCL 120 MG
40 TABLET ORAL
Refills: 0 | Status: DISCONTINUED | OUTPATIENT
Start: 2020-11-24 | End: 2020-11-24

## 2020-11-24 RX ORDER — SOTALOL HCL 120 MG
60 TABLET ORAL
Refills: 0 | Status: DISCONTINUED | OUTPATIENT
Start: 2020-11-24 | End: 2020-11-26

## 2020-11-24 RX ADMIN — Medication 40 MILLIGRAM(S): at 10:45

## 2020-11-24 RX ADMIN — Medication 25 MILLIGRAM(S): at 05:15

## 2020-11-24 RX ADMIN — Medication 0.6 MILLIGRAM(S): at 05:15

## 2020-11-24 RX ADMIN — Medication 60 MILLIGRAM(S): at 21:46

## 2020-11-24 RX ADMIN — Medication 0.6 MILLIGRAM(S): at 18:10

## 2020-11-24 RX ADMIN — PANTOPRAZOLE SODIUM 40 MILLIGRAM(S): 20 TABLET, DELAYED RELEASE ORAL at 06:48

## 2020-11-24 RX ADMIN — LOSARTAN POTASSIUM 100 MILLIGRAM(S): 100 TABLET, FILM COATED ORAL at 05:15

## 2020-11-24 RX ADMIN — AMLODIPINE BESYLATE 5 MILLIGRAM(S): 2.5 TABLET ORAL at 22:14

## 2020-11-24 NOTE — DIETITIAN INITIAL EVALUATION ADULT. - SIGNS/SYMPTOMS
Report to Hoang Baker RN at this time. Patient remains updated with plan for admission. Denies pain. No distress observed. AEB pt in need of education

## 2020-11-24 NOTE — DIETITIAN INITIAL EVALUATION ADULT. - OTHER INFO
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 Pt was admitted to [facility] 9/2018 with palpitations in aflutter with a ventricular rate up to 140 bpm and underwent a cardioversion. Echo showed EF of 50-55% and moderate pHTN. She had recurrent atrial fibrillation 5/2020 s/p cardioversion and again 8/2020 s/p cardioversion. Pt now presents for elective ablation. Pt remains admitted d/t elevated INR. No pain. Curtis 20. No edema or pressure ulcers.   Spoke with pt/RN. Pt reports eating well PTA, reports not consuming Vit-K food PTA 2/2 coumadin use, otherwise not following any type of diet. Pt reports decreased PO intake 11/23 2/2 some nausea, denies nausea or vomiting today and better PO intake at breakfast of DASH TLC diet.  pounds, denies wt changes PTA, admit wt of 156 pounds vs most recent EMR wt of 167.1 pounds- will cont to monitor. NKFA. No issues chewing/swallowing.   Please see below for nutritions recommendations.

## 2020-11-24 NOTE — PROGRESS NOTE ADULT - ASSESSMENT
68 y/o F with HTN, Rheumatic MV s/p mechanical MV (17 years ago), and AFIB with RVR who had dccv in the past. She is s/p elective PVI with RF ablation. Pt had atypical aflutter which was also ablated during the case (posterior ablation line connecting posterolateral mitral isthmus to LIPV).  Warfarin was resumed post procedure given INR 2.8 yesterday. Today INR was 5.5.  Pt reported hematuria this morning.  Bilateral groin wounds are stable.  Tele with some non-sustained AT.  EKG baseline today with QTc 412 ms.    - Repeat INR today. If still high, will hold Warfarin tonight and keep her today given hematuria this morning. Continue monitoring urine output color.   - Plan to start Sotalol 40 mg BID today.  Baseline QTc this morning was 412ms.  Will need to do EKG 2-3 hours post each dose for today and tomorrow morning.   - Continue home meds (Toprol 25 mg daily, Losartan).   - Continue Colchicine (total 1 week post ablation) and Protonix (total 1 month post ablation).   - Rounded with Dr. Teresa at bedside this morning. 70 y/o F with HTN, Rheumatic MV s/p mechanical MV (17 years ago), and AFIB with RVR who had dccv in the past. She is s/p elective PVI with RF ablation. Pt had atypical aflutter which was also ablated during the case (posterior ablation line connecting posterolateral mitral isthmus to LIPV).  Warfarin was resumed post procedure given INR 2.8 yesterday. Today INR was 5.5.  Pt reported hematuria this morning.  Bilateral groin wounds are stable.  Tele with some non-sustained AT.  EKG baseline today with QTc 412 ms.    - Repeat INR today. If still high, will hold Warfarin tonight and keep her today given hematuria this morning. Continue monitoring urine output color.   - Plan to start Sotalol 40 mg BID today.  Baseline QTc this morning was 412ms.  Will need to do EKG 2-3 hours post each dose for today and tomorrow morning.   - Continue home meds (Toprol 25 mg daily, Losartan).   - Continue Colchicine (total 2 weeks post ablation) and Protonix (total 1 month post ablation).   - Rounded with Dr. Teresa at bedside this morning.

## 2020-11-24 NOTE — DIETITIAN INITIAL EVALUATION ADULT. - OTHER CALCULATIONS
IBW used to calculate energy needs due to pt's current body weight exceeding 120% of IBW, adjusted for age, current conditions, EF - fluids per team

## 2020-11-24 NOTE — PROGRESS NOTE ADULT - SUBJECTIVE AND OBJECTIVE BOX
EPS Progress Note  CC: AFIB ablation     S: Pt had hematuria this morning.  Otherwise, no chest pain / dizziness/ palpitations / SOB.    She states that she had voided about 2.5 hours ago that had some brown clot.     O: T(C): 36.1 (11-24-20 @ 09:54), Max: 36.6 (11-24-20 @ 00:08)  HR: 65 (11-24-20 @ 08:30) (60 - 78)  BP: 131/62 (11-24-20 @ 08:30) (131/62 - 178/81)  RR: 18 (11-24-20 @ 08:30) (16 - 20)  SpO2: 98% (11-24-20 @ 08:30) (98% - 100%)    TELE:  NSR HR 60-70s.  paroxysmal AT (short bursts)   EKG today: NSR 64 bpm. QTc 412 ms     PHYSICAL  Constitutional:  NAD        Neck: No JVD  Pulm:  CTA B/L, no wheeze or rale   Cardiac:   + s1/s2, RRR  GI:  +BS , soft ND/NT  Vascular: No LE edema, pulse 2+.  Bilateral groins without bleed or hematoma.   Neuro: AAO x 3. no focal deficit  Skin: Warm. No rash or lesion     LABS:                        13.3   6.82  )-----------( 214      ( 23 Nov 2020 08:33 )             43.1     11-24    139  |  108  |  19  ----------------------------<  124<H>  4.0   |  21<L>  |  1.04    Ca    9.3      24 Nov 2020 07:03  Mg     2.0     11-23      PT/INR - ( 24 Nov 2020 08:07 )   PT: 61.1 sec;   INR: 5.52          PTT - ( 23 Nov 2020 08:33 )  PTT:51.3 sec    MEDICATIONS:  colchicine 0.6 milliGRAM(s) Oral two times a day  losartan 100 milliGRAM(s) Oral daily  metoprolol succinate ER 25 milliGRAM(s) Oral daily  pantoprazole    Tablet 40 milliGRAM(s) Oral before breakfast

## 2020-11-24 NOTE — CHART NOTE - NSCHARTNOTEFT_GEN_A_CORE
Called by RN ~5am for gross hematuria. Evaluated patient at bedside, she is s/p Afib ablation, she voided about 100cc of bloody urine via primafit catheter. As per nurse, she ambulated pt to the bathroom where she voided a small amount of bloody urine as well. Currently, she has no acute complaints, /81, HR 66. Groin site c/d/i, with mild ttp, no hematoma palpated    A/P:  Given recent procedure with groin access, will r/o femoral pseudoaneurysm vs bladder injury. UTI also in the ddx, however low on the differential given lack of other signs/symptoms suggesting infection  - f/u pelvic U/S (if non-conclusive, will consider CT)  - f/u UA  - closely monitor CBC, vitals Called by RN ~5am for gross hematuria. Evaluated patient at bedside, she is s/p Afib ablation, she voided about 100cc of bloody urine via primafit catheter. As per nurse, she ambulated pt to the bathroom where she voided a small amount of bloody urine as well. Currently, she has no acute complaints, /81, HR 66. Groin site c/d/i, with mild ttp, no hematoma palpated    A/P:  Given recent procedure with groin access, will r/o femoral pseudoaneurysm vs bladder injury. UTI also in the ddx, however low on the differential given lack of other signs/symptoms suggesting infection  - f/u pelvic U/S (if inconclusive, will consider CT)  - f/u UA  - closely monitor CBC, vitals Called by RN ~5am for gross hematuria. Evaluated patient at bedside, she is s/p Afib ablation, she voided about 100cc of bloody urine via primafit catheter. As per nurse, she ambulated pt to the bathroom where she voided a small amount of bloody urine as well. Currently, she has no acute complaints, /81, HR 66, last T 97.8 (rectal, as per RN). Groin site c/d/i, with mild ttp, no hematoma palpated    A/P:  Given recent procedure with groin access, will r/o femoral pseudoaneurysm vs bladder injury. UTI also in the ddx, however low on the differential given lack of other signs/symptoms suggesting infection  - f/u pelvic U/S (if inconclusive, will consider CT)  - f/u UA  - closely monitor CBC, vitals

## 2020-11-25 ENCOUNTER — TRANSCRIPTION ENCOUNTER (OUTPATIENT)
Age: 70
End: 2020-11-25

## 2020-11-25 DIAGNOSIS — I47.1 SUPRAVENTRICULAR TACHYCARDIA: ICD-10-CM

## 2020-11-25 DIAGNOSIS — R31.0 GROSS HEMATURIA: ICD-10-CM

## 2020-11-25 DIAGNOSIS — I48.0 PAROXYSMAL ATRIAL FIBRILLATION: ICD-10-CM

## 2020-11-25 DIAGNOSIS — R79.1 ABNORMAL COAGULATION PROFILE: ICD-10-CM

## 2020-11-25 LAB
ANION GAP SERPL CALC-SCNC: 11 MMOL/L — SIGNIFICANT CHANGE UP (ref 5–17)
APTT BLD: 49.8 SEC — HIGH (ref 27.5–35.5)
APTT BLD: 51.3 SEC — HIGH (ref 27.5–35.5)
APTT BLD: 53.8 SEC — HIGH (ref 27.5–35.5)
APTT BLD: 60.1 SEC — HIGH (ref 27.5–35.5)
BUN SERPL-MCNC: 19 MG/DL — SIGNIFICANT CHANGE UP (ref 7–23)
CALCIUM SERPL-MCNC: 9.3 MG/DL — SIGNIFICANT CHANGE UP (ref 8.4–10.5)
CHLORIDE SERPL-SCNC: 107 MMOL/L — SIGNIFICANT CHANGE UP (ref 96–108)
CO2 SERPL-SCNC: 22 MMOL/L — SIGNIFICANT CHANGE UP (ref 22–31)
CREAT SERPL-MCNC: 0.88 MG/DL — SIGNIFICANT CHANGE UP (ref 0.5–1.3)
GLUCOSE SERPL-MCNC: 92 MG/DL — SIGNIFICANT CHANGE UP (ref 70–99)
HCT VFR BLD CALC: 36.7 % — SIGNIFICANT CHANGE UP (ref 34.5–45)
HGB BLD-MCNC: 11.6 G/DL — SIGNIFICANT CHANGE UP (ref 11.5–15.5)
INR BLD: 5.18 — CRITICAL HIGH (ref 0.88–1.16)
INR BLD: 5.77 — CRITICAL HIGH (ref 0.88–1.16)
INR BLD: 6.75 — CRITICAL HIGH (ref 0.88–1.16)
INR BLD: 7.28 — CRITICAL HIGH (ref 0.88–1.16)
MCHC RBC-ENTMCNC: 28.1 PG — SIGNIFICANT CHANGE UP (ref 27–34)
MCHC RBC-ENTMCNC: 31.6 GM/DL — LOW (ref 32–36)
MCV RBC AUTO: 88.9 FL — SIGNIFICANT CHANGE UP (ref 80–100)
NRBC # BLD: 0 /100 WBCS — SIGNIFICANT CHANGE UP (ref 0–0)
PLATELET # BLD AUTO: 202 K/UL — SIGNIFICANT CHANGE UP (ref 150–400)
POTASSIUM SERPL-MCNC: 3.9 MMOL/L — SIGNIFICANT CHANGE UP (ref 3.5–5.3)
POTASSIUM SERPL-SCNC: 3.9 MMOL/L — SIGNIFICANT CHANGE UP (ref 3.5–5.3)
PROTHROM AB SERPL-ACNC: 57.5 SEC — HIGH (ref 10.6–13.6)
PROTHROM AB SERPL-ACNC: 63.7 SEC — HIGH (ref 10.6–13.6)
PROTHROM AB SERPL-ACNC: 74 SEC — HIGH (ref 10.6–13.6)
PROTHROM AB SERPL-ACNC: 79.5 SEC — HIGH (ref 10.6–13.6)
RBC # BLD: 4.13 M/UL — SIGNIFICANT CHANGE UP (ref 3.8–5.2)
RBC # FLD: 14.2 % — SIGNIFICANT CHANGE UP (ref 10.3–14.5)
SODIUM SERPL-SCNC: 140 MMOL/L — SIGNIFICANT CHANGE UP (ref 135–145)
WBC # BLD: 10.69 K/UL — HIGH (ref 3.8–10.5)
WBC # FLD AUTO: 10.69 K/UL — HIGH (ref 3.8–10.5)

## 2020-11-25 RX ORDER — ENOXAPARIN SODIUM 100 MG/ML
80 INJECTION SUBCUTANEOUS
Qty: 13 | Refills: 0 | Status: DISCONTINUED | COMMUNITY
Start: 2020-08-21 | End: 2020-11-25

## 2020-11-25 RX ORDER — MUPIROCIN 20 MG/G
2 OINTMENT TOPICAL
Qty: 44 | Refills: 0 | Status: DISCONTINUED | COMMUNITY
Start: 2016-10-26 | End: 2020-11-25

## 2020-11-25 RX ORDER — SOTALOL HCL 120 MG
0.5 TABLET ORAL
Qty: 30 | Refills: 2
Start: 2020-11-25 | End: 2021-02-22

## 2020-11-25 RX ORDER — PANTOPRAZOLE 40 MG/1
40 TABLET, DELAYED RELEASE ORAL DAILY
Qty: 30 | Refills: 0 | Status: ACTIVE | COMMUNITY
Start: 2020-11-25 | End: 1900-01-01

## 2020-11-25 RX ORDER — DRONEDARONE 400 MG/1
400 TABLET, FILM COATED ORAL TWICE DAILY
Qty: 60 | Refills: 3 | Status: DISCONTINUED | COMMUNITY
Start: 2020-10-08 | End: 2020-11-25

## 2020-11-25 RX ORDER — WARFARIN SODIUM 2.5 MG/1
2 TABLET ORAL ONCE
Refills: 0 | Status: DISCONTINUED | OUTPATIENT
Start: 2020-11-25 | End: 2020-11-25

## 2020-11-25 RX ORDER — PANTOPRAZOLE SODIUM 20 MG/1
1 TABLET, DELAYED RELEASE ORAL
Qty: 30 | Refills: 0
Start: 2020-11-25 | End: 2020-12-24

## 2020-11-25 RX ORDER — COLCHICINE 0.6 MG
1 TABLET ORAL
Qty: 20 | Refills: 0
Start: 2020-11-25 | End: 2020-12-04

## 2020-11-25 RX ORDER — WARFARIN SODIUM 2.5 MG/1
1 TABLET ORAL ONCE
Refills: 0 | Status: COMPLETED | OUTPATIENT
Start: 2020-11-25 | End: 2020-11-25

## 2020-11-25 RX ADMIN — Medication 60 MILLIGRAM(S): at 19:05

## 2020-11-25 RX ADMIN — Medication 0.6 MILLIGRAM(S): at 19:01

## 2020-11-25 RX ADMIN — Medication 0.6 MILLIGRAM(S): at 06:06

## 2020-11-25 RX ADMIN — LOSARTAN POTASSIUM 100 MILLIGRAM(S): 100 TABLET, FILM COATED ORAL at 06:07

## 2020-11-25 RX ADMIN — WARFARIN SODIUM 1 MILLIGRAM(S): 2.5 TABLET ORAL at 21:52

## 2020-11-25 RX ADMIN — Medication 25 MILLIGRAM(S): at 13:25

## 2020-11-25 RX ADMIN — PANTOPRAZOLE SODIUM 40 MILLIGRAM(S): 20 TABLET, DELAYED RELEASE ORAL at 06:06

## 2020-11-25 RX ADMIN — Medication 60 MILLIGRAM(S): at 06:07

## 2020-11-25 NOTE — DISCHARGE NOTE PROVIDER - CARE PROVIDER_API CALL
Jason Teresa)  Cardiac Electrophysiology  [address], [address] [place] [address]  Phone: [phone]  Fax: [phone]  Follow Up Time:

## 2020-11-25 NOTE — PROGRESS NOTE ADULT - PROBLEM SELECTOR PLAN 2
Pt has been on Warfarin at home for history of mechanical MVR.  Beside the antx Ancef that was given in the procedure, there are no other meds that can potentiate warfarin level.  It peaked at 7.9 last night and is trending down today (latest was 6.75 at 9AM).    Likely repeat another one today.  No need to reverse with Vit K as INR is trending down.

## 2020-11-25 NOTE — DISCHARGE NOTE PROVIDER - NSDCFUADDINST_GEN_ALL_CORE_FT
- You had ablation of atrial fibrillation and atrial flutter on 11/23/20.   - You were staying for few extra days because your INR became supratherapeutic and you had blood in the urine as a result of that.  Your INR came down eventually.   - You were started on Sotalol 120 mg HALF tablet (ie 60 mg) to be taken twice daily while you were here.  A prescription with 2 refills was [facility] to [facility] ([place]    - Check you INR on Friday or Saturday this week. Goal INR is 2.5 - 3.5 (because you have a mechanical mitral valve). Please have result sent to your cardiologist Dr. Michaels so he can call you to adjust dose if needed.   - Follow up with DR. Teresa on 12/16/20 (weds) at 11:15 AM.   - Wound care instruction:  Avoid strenuous activities such as lifting, running, pushing or sex for 1 week in order to avoid bleeding complications in the groin.  If any questions about the groin, call us at [phone].  Ok to shower tonight.  Avoid bathing for 1 week

## 2020-11-25 NOTE — DISCHARGE NOTE PROVIDER - NSDCFUSCHEDAPPT_GEN_ALL_CORE_FT
SYLWIA WISE ; 12/16/2020 ; [facility] [address]  SYLWIA WISE ; 02/10/2021 ; [facility] [address]

## 2020-11-25 NOTE — PROGRESS NOTE ADULT - PROBLEM SELECTOR PLAN 3
Pt had hematuria 11/24/20 morning when INR was still uptrending. Hematuria resolving. Urine is clearing up.   H/H is slightly lower than on admission, but is not alarming. It's stable today.   Suspect hematuria to completely resolve once INR is back down to her baseline (2.5-3.5)

## 2020-11-25 NOTE — DISCHARGE NOTE PROVIDER - NSDCMRMEDTOKEN_GEN_ALL_CORE_FT
colchicine 0.6 mg oral tablet: 1 tab(s) orally 2 times a day  ** no need for refill   Coumadin 5 mg oral tablet: 1 tab(s) orally once a day   losartan: 100 milligram(s) orally once a day  pantoprazole 40 mg oral delayed release tablet: 1 tab(s) orally once a day (before a meal)  ** no need for refill   sotalol 120 mg oral tablet: 0.5 tab(s) orally 2 times a day   ** New med   Toprol-XL 25 mg oral tablet, extended release: 1 tab(s) orally once a day

## 2020-11-25 NOTE — DISCHARGE NOTE PROVIDER - NSDCCPCAREPLAN_GEN_ALL_CORE_FT
PRINCIPAL DISCHARGE DIAGNOSIS  Diagnosis: Paroxysmal atrial fibrillation  Assessment and Plan of Treatment: Paroxysmal atrial fibrillation      SECONDARY DISCHARGE DIAGNOSES  Diagnosis: Supratherapeutic INR  Assessment and Plan of Treatment: Supratherapeutic INR     PRINCIPAL DISCHARGE DIAGNOSIS  Diagnosis: Paroxysmal atrial fibrillation  Assessment and Plan of Treatment: You were found to be in atrial fibrillation (a type of irregular heartbeat) here in the hospital. People with atrial fibrillation are at an increased risk of forming a blood clot in the heart, which can travel to the brain, causing a stroke, or to other parts of the body. You are to continue Metoprolol Succinate (TOPROL XL) 25 mg once daily, and were started on a new medication called SOTALOL 60 mg once daily. Appropriate refills were sent to your pharmacy.   You were also started on Colchicine 0.6 mg to be continued until 12/08/2020.   Additionally, please continue WARFARIN (COUMADIN) 5 mg once daily.   This medication lowers your chance of having a stroke by helping to prevent clots from forming. If you stop taking WARFARIN, you may have increased risk of forming a blood clot in your heart which can cause a stroke. Do not stop taking WARFARIN without talking to your cardiologist. Please follow-up with [facility] labs on Friday 11/27/2020 to have your INR checked.  Please follow-up with your cardiologist, Dr. Teresa, for further management.  Please have your INR sent to Dr. Michaels's office on Friday on 11/27/2020 for further management.      SECONDARY DISCHARGE DIAGNOSES  Diagnosis: Supratherapeutic INR  Assessment and Plan of Treatment: Your INR was found to be very high, your WARFARIN was decreased during your hospital admission. It was then increased back to your home dose of WARFARIN 5 mg once daily. Please follow-up with [email or web address] labs on 11/27/2020 to have your INR checked. Additionally, please follow-up with your cardiologist, Dr. Teresa for further follow-up. Please have your results faxed to Dr. Michaels's office after you have your INR checked.

## 2020-11-25 NOTE — DISCHARGE NOTE PROVIDER - NSDCCPTREATMENT_GEN_ALL_CORE_FT
PRINCIPAL PROCEDURE  Procedure: Cardiac ablation  Findings and Treatment: AFIB (PVI) and atypical aflutter RFA

## 2020-11-25 NOTE — DISCHARGE NOTE PROVIDER - HOSPITAL COURSE
68 y/o F with HTN, Rheumatic MV s/p mechanical MV (17 years ago), and AFIB with RVR who had dccv in the past. She is s/p elective PVI with RF ablation. Pt had atypical aflutter which was also ablated during the case (posterior ablation line connecting posterolateral mitral isthmus to LIPV).  Warfarin was resumed post procedure given INR 2.8 on 11/23/20. Hospital course was noted for supratherapeutic INR (peak at 7.9 on 11/24/20) and hematuria which resolved.  Bilateral groin wounds are stable.  Tele with some non-sustained AT and Sotalol was started.  EKG with acceptable QTc post Sotalol load.  On 11/25/20, INR trended down. 70 y/o F with HTN, Rheumatic MV s/p mechanical MV (17 years ago), and AFIB with RVR who had dccv in the past. She is s/p elective PVI with RF ablation. Pt had atypical aflutter which was also ablated during the case (posterior ablation line connecting posterolateral mitral isthmus to LIPV).  Warfarin was resumed post procedure given INR 2.8 on 11/23/20. Hospital course was noted for supratherapeutic INR (peak at 7.9 on 11/24/20) and hematuria which resolved.  Bilateral groin wounds are stable.  Tele with some non-sustained AT and Sotalol was started.  EKG with acceptable QTc post Sotalol load.  On 11/25/20, INR trended down to 3.76.    No significant events on telemetry overnight. Repeat EKG without ischemic changes. Patient has been medically cleared for discharge as per Dr. Griffith. Patient has been given appropriate discharge instructions including medication regimen, access site management and follow up. As per Dr. Teresa, patient to continue her Coumadin 5 mg once daily, Colchicine 0.6 mg once daily, Toprol XL 25 mg twice daily, Sotalol 60 mg twice daily, and Losartan 100 mg once daily. Medications that patient needs refills on have been e-prescribed to preferred pharmacy.     Temp HR BP RR SpO2  Gen: NAD, A&O x3  Cards: RRR, clear S1 and S2 without murmur  Pulm: CTA B/L without w/r/r  Bilateral groin sites stable: No hematoma or ooze, peripheral pulses 2+ B/L  Abd: soft, NT  Ext: no LE edema or ulcerations B/L

## 2020-11-25 NOTE — PROGRESS NOTE ADULT - SUBJECTIVE AND OBJECTIVE BOX
EPS Progress Note  CC: AFIB ablation    S: no acute event overnight. Tolerating sotalol. Urine is clearing up as per patient.     O: T(C): 36.6 (11-25-20 @ 04:30), Max: 37.6 (11-24-20 @ 18:11)  HR: 54 (11-25-20 @ 08:57) (54 - 70)  BP: 179/80 (11-25-20 @ 08:57) (153/71 - 201/97)  RR: 18 (11-25-20 @ 08:57) (16 - 19)  SpO2: 98% (11-25-20 @ 08:57) (95% - 100%)    TELE: NSR.  HR 60s.  Few PAC.  EKG 11/25/20 (midnight): NSR. QTc 413 ms.   EKG 11/25/20 (8am): NSR. QTc 449 ms.    PHYSICAL  Constitutional:  NAD        Neck: No JVD  Pulm:  CTA B/L, no wheeze or rale   Cardiac:   + s1/s2, RRR  GI:  +BS , soft ND/NT  Vascular: No LE edema, pulse 2+. Stable groin wounds   Neuro: AAO x 3. no focal deficit  Skin: Warm. No rash or lesion     LABS:                        11.6   10.69 )-----------( 202      ( 25 Nov 2020 07:24 )             36.7     11-25    140  |  107  |  19  ----------------------------<  92  3.9   |  22  |  0.88    Ca    9.3      25 Nov 2020 07:24    TPro  6.0  /  Alb  3.4  /  TBili  0.3  /  DBili  <0.2  /  AST  41<H>  /  ALT  8<L>  /  AlkPhos  70  11-24    PT/INR - ( 25 Nov 2020 07:24 )   PT: 79.5 sec;   INR: 7.28          PTT - ( 25 Nov 2020 07:24 )  PTT:51.3 sec    MEDICATIONS:  colchicine 0.6 milliGRAM(s) Oral two times a day  losartan 100 milliGRAM(s) Oral daily  metoprolol succinate ER 25 milliGRAM(s) Oral daily  pantoprazole    Tablet 40 milliGRAM(s) Oral before breakfast  sotalol 60 milliGRAM(s) Oral two times a day

## 2020-11-25 NOTE — PROGRESS NOTE ADULT - ASSESSMENT
70 y/o F with HTN, Rheumatic MV s/p mechanical MV (17 years ago), and AFIB with RVR who had dccv in the past. She is s/p elective PVI with RF ablation. Pt had atypical aflutter which was also ablated during the case (posterior ablation line connecting posterolateral mitral isthmus to LIPV).  Warfarin was resumed post procedure given INR 2.8 yesterday. Today INR was 5.5.  Pt reported hematuria this morning.  Bilateral groin wounds are stable.  Tele with some non-sustained AT.  EKG baseline today with QTc 412 ms.

## 2020-11-25 NOTE — PROGRESS NOTE ADULT - PROBLEM SELECTOR PLAN 1
s/p RFA of afib and atypical aflutter on 11/23/20.    Remains in sinus with occasional non-sustained AT.  Started on Sotalol 11/24/20.  QTc on serial EKG (2-3 hours post sotalol dose) has been acceptable. QTc this morning was 449 ms.   Will plan for discharge on Sotalol 60 mg BID (Rx for 120 mg HALF tablet BID).

## 2020-11-26 ENCOUNTER — TRANSCRIPTION ENCOUNTER (OUTPATIENT)
Age: 70
End: 2020-11-26

## 2020-11-26 VITALS — TEMPERATURE: 99 F

## 2020-11-26 LAB
ANION GAP SERPL CALC-SCNC: 12 MMOL/L — SIGNIFICANT CHANGE UP (ref 5–17)
APTT BLD: 52 SEC — HIGH (ref 27.5–35.5)
BUN SERPL-MCNC: 16 MG/DL — SIGNIFICANT CHANGE UP (ref 7–23)
CALCIUM SERPL-MCNC: 9.1 MG/DL — SIGNIFICANT CHANGE UP (ref 8.4–10.5)
CHLORIDE SERPL-SCNC: 104 MMOL/L — SIGNIFICANT CHANGE UP (ref 96–108)
CO2 SERPL-SCNC: 23 MMOL/L — SIGNIFICANT CHANGE UP (ref 22–31)
CREAT SERPL-MCNC: 0.77 MG/DL — SIGNIFICANT CHANGE UP (ref 0.5–1.3)
GLUCOSE SERPL-MCNC: 93 MG/DL — SIGNIFICANT CHANGE UP (ref 70–99)
HCT VFR BLD CALC: 37.9 % — SIGNIFICANT CHANGE UP (ref 34.5–45)
HGB BLD-MCNC: 12.1 G/DL — SIGNIFICANT CHANGE UP (ref 11.5–15.5)
INR BLD: 3.76 — HIGH (ref 0.88–1.16)
MCHC RBC-ENTMCNC: 27.9 PG — SIGNIFICANT CHANGE UP (ref 27–34)
MCHC RBC-ENTMCNC: 31.9 GM/DL — LOW (ref 32–36)
MCV RBC AUTO: 87.5 FL — SIGNIFICANT CHANGE UP (ref 80–100)
NRBC # BLD: 0 /100 WBCS — SIGNIFICANT CHANGE UP (ref 0–0)
PLATELET # BLD AUTO: 191 K/UL — SIGNIFICANT CHANGE UP (ref 150–400)
POTASSIUM SERPL-MCNC: 3.7 MMOL/L — SIGNIFICANT CHANGE UP (ref 3.5–5.3)
POTASSIUM SERPL-SCNC: 3.7 MMOL/L — SIGNIFICANT CHANGE UP (ref 3.5–5.3)
PROTHROM AB SERPL-ACNC: 42.4 SEC — HIGH (ref 10.6–13.6)
RBC # BLD: 4.33 M/UL — SIGNIFICANT CHANGE UP (ref 3.8–5.2)
RBC # FLD: 13.8 % — SIGNIFICANT CHANGE UP (ref 10.3–14.5)
SODIUM SERPL-SCNC: 139 MMOL/L — SIGNIFICANT CHANGE UP (ref 135–145)
WBC # BLD: 7.57 K/UL — SIGNIFICANT CHANGE UP (ref 3.8–10.5)
WBC # FLD AUTO: 7.57 K/UL — SIGNIFICANT CHANGE UP (ref 3.8–10.5)

## 2020-11-26 RX ORDER — METOPROLOL TARTRATE 50 MG
1 TABLET ORAL
Qty: 30 | Refills: 0
Start: 2020-11-26 | End: 2020-12-25

## 2020-11-26 RX ORDER — POTASSIUM CHLORIDE 20 MEQ
40 PACKET (EA) ORAL ONCE
Refills: 0 | Status: COMPLETED | OUTPATIENT
Start: 2020-11-26 | End: 2020-11-26

## 2020-11-26 RX ORDER — AMLODIPINE BESYLATE 2.5 MG/1
5 TABLET ORAL ONCE
Refills: 0 | Status: COMPLETED | OUTPATIENT
Start: 2020-11-26 | End: 2020-11-26

## 2020-11-26 RX ORDER — METOPROLOL TARTRATE 50 MG
1 TABLET ORAL
Qty: 0 | Refills: 0 | DISCHARGE

## 2020-11-26 RX ADMIN — PANTOPRAZOLE SODIUM 40 MILLIGRAM(S): 20 TABLET, DELAYED RELEASE ORAL at 06:04

## 2020-11-26 RX ADMIN — Medication 40 MILLIEQUIVALENT(S): at 07:49

## 2020-11-26 RX ADMIN — Medication 60 MILLIGRAM(S): at 06:05

## 2020-11-26 RX ADMIN — LOSARTAN POTASSIUM 100 MILLIGRAM(S): 100 TABLET, FILM COATED ORAL at 06:04

## 2020-11-26 RX ADMIN — AMLODIPINE BESYLATE 5 MILLIGRAM(S): 2.5 TABLET ORAL at 00:55

## 2020-11-26 RX ADMIN — Medication 25 MILLIGRAM(S): at 06:04

## 2020-11-26 RX ADMIN — Medication 0.6 MILLIGRAM(S): at 06:04

## 2020-11-26 NOTE — DISCHARGE NOTE NURSING/CASE MANAGEMENT/SOCIAL WORK - PATIENT PORTAL LINK FT
You can access the FollowMyHealth Patient Portal offered by [facility] by registering at the following website: [email or web address]. By joining [email or web address]’s FollowMyHealth portal, you will also be able to view your health information using other applications (apps) compatible with our system.

## 2020-12-01 PROCEDURE — 85027 COMPLETE CBC AUTOMATED: CPT

## 2020-12-01 PROCEDURE — C1760: CPT

## 2020-12-01 PROCEDURE — 86850 RBC ANTIBODY SCREEN: CPT

## 2020-12-01 PROCEDURE — 93655 ICAR CATH ABLTJ DSCRT ARRHYT: CPT

## 2020-12-01 PROCEDURE — 80076 HEPATIC FUNCTION PANEL: CPT

## 2020-12-01 PROCEDURE — C1732: CPT

## 2020-12-01 PROCEDURE — 86901 BLOOD TYPING SEROLOGIC RH(D): CPT

## 2020-12-01 PROCEDURE — C1759: CPT

## 2020-12-01 PROCEDURE — 93005 ELECTROCARDIOGRAM TRACING: CPT

## 2020-12-01 PROCEDURE — 93656 COMPRE EP EVAL ABLTJ ATR FIB: CPT

## 2020-12-01 PROCEDURE — C2630: CPT

## 2020-12-01 PROCEDURE — 86880 COOMBS TEST DIRECT: CPT

## 2020-12-01 PROCEDURE — 86900 BLOOD TYPING SEROLOGIC ABO: CPT

## 2020-12-01 PROCEDURE — C1766: CPT

## 2020-12-01 PROCEDURE — 93613 INTRACARDIAC EPHYS 3D MAPG: CPT

## 2020-12-01 PROCEDURE — C1894: CPT

## 2020-12-01 PROCEDURE — 81001 URINALYSIS AUTO W/SCOPE: CPT

## 2020-12-01 PROCEDURE — 93662 INTRACARDIAC ECG (ICE): CPT

## 2020-12-01 PROCEDURE — 85730 THROMBOPLASTIN TIME PARTIAL: CPT

## 2020-12-01 PROCEDURE — 85610 PROTHROMBIN TIME: CPT

## 2020-12-01 PROCEDURE — C1730: CPT

## 2020-12-01 PROCEDURE — 83735 ASSAY OF MAGNESIUM: CPT

## 2020-12-01 PROCEDURE — 93623 PRGRMD STIMJ&PACG IV RX NFS: CPT

## 2020-12-01 PROCEDURE — 36415 COLL VENOUS BLD VENIPUNCTURE: CPT

## 2020-12-01 PROCEDURE — 86870 RBC ANTIBODY IDENTIFICATION: CPT

## 2020-12-01 PROCEDURE — 80048 BASIC METABOLIC PNL TOTAL CA: CPT

## 2020-12-08 ENCOUNTER — APPOINTMENT (OUTPATIENT)
Dept: HEART AND VASCULAR | Facility: CLINIC | Age: 70
End: 2020-12-08
Payer: MEDICARE

## 2020-12-08 VITALS
HEIGHT: 65 IN | BODY MASS INDEX: 26.16 KG/M2 | SYSTOLIC BLOOD PRESSURE: 136 MMHG | TEMPERATURE: 97.6 F | OXYGEN SATURATION: 98 % | HEART RATE: 80 BPM | DIASTOLIC BLOOD PRESSURE: 80 MMHG | WEIGHT: 157 LBS | RESPIRATION RATE: 14 BRPM

## 2020-12-08 LAB
INR PPP: 2.8
PT BLD: 33.7

## 2020-12-08 PROCEDURE — 93000 ELECTROCARDIOGRAM COMPLETE: CPT

## 2020-12-08 PROCEDURE — 99214 OFFICE O/P EST MOD 30 MIN: CPT

## 2020-12-08 NOTE — HISTORY OF PRESENT ILLNESS
[FreeTextEntry1] : s/p RFA for AF\par on sotalol now\par comps of mild lightheadedness, no syncope\par air hunger at times\par no cp \par no palps\par no edema or orthop

## 2020-12-08 NOTE — DISCUSSION/SUMMARY
[FreeTextEntry1] : in nsr- Twave abnlty (has been present on prior EKG's)\par INR 2.8 - same dose\par has EP f/u\par f/u w me in 3 mo

## 2020-12-16 ENCOUNTER — APPOINTMENT (OUTPATIENT)
Dept: HEART AND VASCULAR | Facility: CLINIC | Age: 70
End: 2020-12-16

## 2021-01-21 ENCOUNTER — RX RENEWAL (OUTPATIENT)
Age: 71
End: 2021-01-21

## 2021-01-22 ENCOUNTER — RX RENEWAL (OUTPATIENT)
Age: 71
End: 2021-01-22

## 2021-01-28 ENCOUNTER — APPOINTMENT (OUTPATIENT)
Dept: HEART AND VASCULAR | Facility: CLINIC | Age: 71
End: 2021-01-28
Payer: MEDICARE

## 2021-01-28 ENCOUNTER — NON-APPOINTMENT (OUTPATIENT)
Age: 71
End: 2021-01-28

## 2021-01-28 VITALS — HEART RATE: 65 BPM | WEIGHT: 157 LBS | BODY MASS INDEX: 26.16 KG/M2 | HEIGHT: 65 IN | TEMPERATURE: 98 F

## 2021-01-28 VITALS — SYSTOLIC BLOOD PRESSURE: 190 MMHG | DIASTOLIC BLOOD PRESSURE: 93 MMHG

## 2021-01-28 VITALS — DIASTOLIC BLOOD PRESSURE: 97 MMHG | SYSTOLIC BLOOD PRESSURE: 210 MMHG

## 2021-01-28 PROCEDURE — 93000 ELECTROCARDIOGRAM COMPLETE: CPT

## 2021-01-28 PROCEDURE — 99214 OFFICE O/P EST MOD 30 MIN: CPT

## 2021-01-28 NOTE — HISTORY OF PRESENT ILLNESS
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 Echo showed EF of 50-55% and moderate pHTN.  She had recurrent atrial fibrillation 5/2020 s/p cardioversion and again 8/2020 s/p cardioversion.  She has been resistant to an ablation, but ultimately decided to proceed and underwent a pulmonary  vein isolation and atrial flutter ablation 11/2020.  Since then she has been doing well.  She denies any palpitations, SOB, syncope, near syncope or edema.  She is compliant with Eliquis. She is on Sotalol.  She did not take her morning blood pressure medications today.

## 2021-01-28 NOTE — DISCUSSION/SUMMARY
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  She is in NSR today and notes a improvement in the way she feels post ablation.  I have asked her to continue all of her medications including Sotalol and oral anticoagulation.  Her blood pressure is elevated today and she did not take her morning BP medications.  I have told her the importance of better blood pressure control given her oral anticoagulation and for long term rhythm management.  She would like to go home with her son and take her medications and will recheck it after this.  She will take daily blood pressures and see her cardiologist next week to review this.  She will follow up with me in 3 months or sooner if needed.  She knows to call with any questions or concerns.

## 2021-01-28 NOTE — REVIEW OF SYSTEMS
[see HPI] : see HPI [Negative] : Heme/Lymph [Fever] : no fever [Headache] : no headache [Recent Weight Gain (___ Lbs)] : no recent weight gain [Chills] : no chills [Feeling Fatigued] : not feeling fatigued [Recent Weight Loss (___ Lbs)] : no recent weight loss

## 2021-01-28 NOTE — PHYSICAL EXAM
[General Appearance - Well Developed] : well developed [Normal Appearance] : normal appearance [Well Groomed] : well groomed [General Appearance - Well Nourished] : well nourished [No Deformities] : no deformities [General Appearance - In No Acute Distress] : no acute distress [Normal Conjunctiva] : the conjunctiva exhibited no abnormalities [Normal Oral Mucosa] : normal oral mucosa [Normal Jugular Venous V Waves Present] : normal jugular venous V waves present [] : no respiratory distress [Respiration, Rhythm And Depth] : normal respiratory rhythm and effort [Exaggerated Use Of Accessory Muscles For Inspiration] : no accessory muscle use [Auscultation Breath Sounds / Voice Sounds] : lungs were clear to auscultation bilaterally [Heart Rate And Rhythm] : heart rate and rhythm were normal [Heart Sounds] : normal S1 and S2 [Abnormal Walk] : normal gait [Skin Color & Pigmentation] : normal skin color and pigmentation [Oriented To Time, Place, And Person] : oriented to person, place, and time [Impaired Insight] : insight and judgment were intact [Affect] : the affect was normal [Mood] : the mood was normal [Cyanosis, Localized] : no localized cyanosis

## 2021-02-10 ENCOUNTER — APPOINTMENT (OUTPATIENT)
Dept: HEART AND VASCULAR | Facility: CLINIC | Age: 71
End: 2021-02-10

## 2021-02-22 LAB — INR PPP: 2.6

## 2021-03-10 ENCOUNTER — APPOINTMENT (OUTPATIENT)
Dept: HEART AND VASCULAR | Facility: CLINIC | Age: 71
End: 2021-03-10
Payer: MEDICARE

## 2021-03-10 VITALS
DIASTOLIC BLOOD PRESSURE: 90 MMHG | SYSTOLIC BLOOD PRESSURE: 150 MMHG | RESPIRATION RATE: 14 BRPM | HEART RATE: 64 BPM | TEMPERATURE: 97.6 F | BODY MASS INDEX: 26.16 KG/M2 | OXYGEN SATURATION: 97 % | HEIGHT: 65 IN | WEIGHT: 157 LBS

## 2021-03-10 VITALS — DIASTOLIC BLOOD PRESSURE: 80 MMHG | SYSTOLIC BLOOD PRESSURE: 140 MMHG

## 2021-03-10 LAB
PROTHROMBIN TIME AND INR, PLASMA: 2.6
PT BLD: 30.9

## 2021-03-10 PROCEDURE — 36415 COLL VENOUS BLD VENIPUNCTURE: CPT

## 2021-03-10 PROCEDURE — 93000 ELECTROCARDIOGRAM COMPLETE: CPT

## 2021-03-10 PROCEDURE — 99214 OFFICE O/P EST MOD 30 MIN: CPT

## 2021-03-10 NOTE — DISCUSSION/SUMMARY
[FreeTextEntry1] : mvr.af- inr = 2.6 remains in ns\par asymptomatic ekg changes- return for echo and treadmill and crtd\par bp ok= 135/80

## 2021-03-10 NOTE — HISTORY OF PRESENT ILLNESS
[FreeTextEntry1] : remains in nsr\par on sotalol/beta now\par no  lightheadedness, no syncope\par no cp \par occ palps but nothing sustained- following w ep\par no edema or orthop

## 2021-03-10 NOTE — PHYSICAL EXAM
[General Appearance - Well Developed] : well developed [Normal Appearance] : normal appearance [Well Groomed] : well groomed [General Appearance - Well Nourished] : well nourished [No Deformities] : no deformities [General Appearance - In No Acute Distress] : no acute distress [Normal Conjunctiva] : the conjunctiva exhibited no abnormalities [Eyelids - No Xanthelasma] : the eyelids demonstrated no xanthelasmas [Normal Oral Mucosa] : normal oral mucosa [No Oral Pallor] : no oral pallor [No Oral Cyanosis] : no oral cyanosis [Normal Jugular Venous A Waves Present] : normal jugular venous A waves present [Normal Jugular Venous V Waves Present] : normal jugular venous V waves present [No Jugular Venous Self A Waves] : no jugular venous self A waves [Respiration, Rhythm And Depth] : normal respiratory rhythm and effort [Exaggerated Use Of Accessory Muscles For Inspiration] : no accessory muscle use [Auscultation Breath Sounds / Voice Sounds] : lungs were clear to auscultation bilaterally [Abdomen Soft] : soft [Abdomen Tenderness] : non-tender [Abdomen Mass (___ Cm)] : no abdominal mass palpated [Abnormal Walk] : normal gait [Gait - Sufficient For Exercise Testing] : the gait was sufficient for exercise testing [Nail Clubbing] : no clubbing of the fingernails [Cyanosis, Localized] : no localized cyanosis [Petechial Hemorrhages (___cm)] : no petechial hemorrhages [FreeTextEntry1] : 3-4 cm area of edema and erythema on the michael surface of r hand [Skin Color & Pigmentation] : normal skin color and pigmentation [] : no rash [No Venous Stasis] : no venous stasis [Skin Lesions] : no skin lesions [No Skin Ulcers] : no skin ulcer [No Xanthoma] : no  xanthoma was observed [Oriented To Time, Place, And Person] : oriented to person, place, and time [Affect] : the affect was normal [Mood] : the mood was normal [No Anxiety] : not feeling anxious

## 2021-04-22 ENCOUNTER — RX RENEWAL (OUTPATIENT)
Age: 71
End: 2021-04-22

## 2021-05-10 ENCOUNTER — NON-APPOINTMENT (OUTPATIENT)
Age: 71
End: 2021-05-10

## 2021-05-21 ENCOUNTER — RX RENEWAL (OUTPATIENT)
Age: 71
End: 2021-05-21

## 2021-06-02 ENCOUNTER — NON-APPOINTMENT (OUTPATIENT)
Age: 71
End: 2021-06-02

## 2021-06-02 ENCOUNTER — APPOINTMENT (OUTPATIENT)
Dept: HEART AND VASCULAR | Facility: CLINIC | Age: 71
End: 2021-06-02
Payer: MEDICARE

## 2021-06-02 VITALS — DIASTOLIC BLOOD PRESSURE: 101 MMHG | SYSTOLIC BLOOD PRESSURE: 207 MMHG | HEART RATE: 65 BPM

## 2021-06-02 VITALS — HEIGHT: 64 IN | WEIGHT: 155 LBS | BODY MASS INDEX: 26.46 KG/M2 | TEMPERATURE: 97.3 F

## 2021-06-02 PROCEDURE — 99214 OFFICE O/P EST MOD 30 MIN: CPT | Mod: 25

## 2021-06-02 PROCEDURE — 93000 ELECTROCARDIOGRAM COMPLETE: CPT

## 2021-06-02 RX ORDER — COLCHICINE 0.6 MG/1
0.6 TABLET ORAL
Qty: 10 | Refills: 0 | Status: DISCONTINUED | COMMUNITY
Start: 2020-11-25 | End: 2021-06-02

## 2021-06-02 RX ORDER — AMOXICILLIN 500 MG/1
500 TABLET, FILM COATED ORAL
Qty: 8 | Refills: 3 | Status: DISCONTINUED | COMMUNITY
Start: 2018-11-12 | End: 2021-06-02

## 2021-06-02 NOTE — DISCUSSION/SUMMARY
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  She is in NSR today and notes a improvement in the way she feels post ablation. I have asked her to continue all of her medications including Sotalol 60 bid with plans to decrease that to 40 BID next visit. She will continue Coumadin and getting an echo soon. Her blood pressure is elevated today and she did not take her morning BP medications but has no symptoms. We advised her to take her BP meds once she gets home and follow closely with her cardiologist as she might benefit from additional anti-hypertensive meds. We have stressed the importance of better blood pressure control given her oral anticoagulation and for long term rhythm management. She will take her daily blood pressures and see her cardiologist to review this. She will follow up with me in 3 months or sooner if needed.  She knows to call with any questions or concerns.

## 2021-06-02 NOTE — REASON FOR VISIT
[Other: ____] : [unfilled] [Follow-Up - Clinic] : a clinic follow-up of [Atrial Fibrillation] : atrial fibrillation

## 2021-06-02 NOTE — PHYSICAL EXAM
[General Appearance - Well Developed] : well developed [Normal Appearance] : normal appearance [Well Groomed] : well groomed [General Appearance - Well Nourished] : well nourished [No Deformities] : no deformities [General Appearance - In No Acute Distress] : no acute distress [Normal Oral Mucosa] : normal oral mucosa [Normal Jugular Venous V Waves Present] : normal jugular venous V waves present [] : no respiratory distress [Respiration, Rhythm And Depth] : normal respiratory rhythm and effort [Exaggerated Use Of Accessory Muscles For Inspiration] : no accessory muscle use [Auscultation Breath Sounds / Voice Sounds] : lungs were clear to auscultation bilaterally [Heart Rate And Rhythm] : heart rate and rhythm were normal [Heart Sounds] : normal S1 and S2 [Abnormal Walk] : normal gait [Cyanosis, Localized] : no localized cyanosis [Skin Color & Pigmentation] : normal skin color and pigmentation [Oriented To Time, Place, And Person] : oriented to person, place, and time [Impaired Insight] : insight and judgment were intact [Affect] : the affect was normal [Mood] : the mood was normal [Well Developed] : well developed [Well Nourished] : well nourished [No Acute Distress] : no acute distress [Normal Conjunctiva] : normal conjunctiva [Normal Venous Pressure] : normal venous pressure [No Carotid Bruit] : no carotid bruit [Normal S1, S2] : normal S1, S2 [No Murmur] : no murmur [No Rub] : no rub [No Gallop] : no gallop [Clear Lung Fields] : clear lung fields [Good Air Entry] : good air entry [No Respiratory Distress] : no respiratory distress  [Soft] : abdomen soft [Non Tender] : non-tender [No Masses/organomegaly] : no masses/organomegaly [Normal Bowel Sounds] : normal bowel sounds [Normal Gait] : normal gait [No Edema] : no edema [No Cyanosis] : no cyanosis [No Clubbing] : no clubbing [No Varicosities] : no varicosities [No Rash] : no rash [No Skin Lesions] : no skin lesions [Moves all extremities] : moves all extremities [No Focal Deficits] : no focal deficits [Normal Speech] : normal speech [Alert and Oriented] : alert and oriented [Normal memory] : normal memory

## 2021-06-02 NOTE — HISTORY OF PRESENT ILLNESS
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 9/2018 with palpitations in aflutter with a ventricular rate up to 140 bpm and underwent a cardioversion. Echo showed EF of 50-55% and moderate pHTN.  She had recurrent atrial fibrillation 5/2020 s/p cardioversion and again 8/2020 s/p cardioversion. She has been resistant to an ablation, but ultimately decided to proceed and underwent a pulmonary vein isolation and atrial flutter ablation 11/2020. Since then she has been doing well. She gets palpitations (flutter-like) for only few seconds, mild fatigue, but denied any SOB, syncope, near syncope or edema. She is compliant with Coumadin with therapeutic INR levels. She is on Sotalol 60 BID and Toprol 50 qD.  She did not take her morning blood pressure medications today. At home, her BP is 140s/80s.     \par \par \par Today in clinic patient reports feeling well. Patient denied chest pain, shortness of breath, palpitations, near syncope or syncope. Blood pressure today was 190/100 with a pulse of 65 bpm and regular. ECG today shows normal sinus rhythm at HR 65 bpm. We checked her BP multiple times and it was still elevated at 180-190 systolic and 100 diastolic.\par

## 2021-06-10 NOTE — PATIENT PROFILE ADULT - NSPROPASSIVESMOKEEXPOSURE_GEN_A_NUR
Leonard Anderson,  Mr. Mitchell Presumjorge went ot [facility] on [place] for his bladder issue. He said there wasn't any blood in his urine and they prescribed Flomax. Still waiting on his blood work. I am requesting by fax records from today.   perez
No

## 2021-06-30 ENCOUNTER — APPOINTMENT (OUTPATIENT)
Dept: HEART AND VASCULAR | Facility: CLINIC | Age: 71
End: 2021-06-30
Payer: MEDICARE

## 2021-06-30 ENCOUNTER — APPOINTMENT (OUTPATIENT)
Dept: HEART AND VASCULAR | Facility: CLINIC | Age: 71
End: 2021-06-30

## 2021-06-30 VITALS
DIASTOLIC BLOOD PRESSURE: 90 MMHG | WEIGHT: 160 LBS | SYSTOLIC BLOOD PRESSURE: 150 MMHG | HEIGHT: 64 IN | BODY MASS INDEX: 27.31 KG/M2 | HEART RATE: 60 BPM | TEMPERATURE: 97.7 F | OXYGEN SATURATION: 95 %

## 2021-06-30 DIAGNOSIS — I65.22 OCCLUSION AND STENOSIS OF LEFT CAROTID ARTERY: ICD-10-CM

## 2021-06-30 LAB
INR PPP: 3.5 RATIO
POCT-PROTHROMBIN TIME: 41.6 SECS

## 2021-06-30 PROCEDURE — 93306 TTE W/DOPPLER COMPLETE: CPT

## 2021-06-30 PROCEDURE — 99214 OFFICE O/P EST MOD 30 MIN: CPT

## 2021-06-30 PROCEDURE — 93880 EXTRACRANIAL BILAT STUDY: CPT

## 2021-06-30 NOTE — PHYSICAL EXAM
[Well Developed] : well developed [Well Nourished] : well nourished [No Acute Distress] : no acute distress [Normal Conjunctiva] : normal conjunctiva [Normal Venous Pressure] : normal venous pressure [No Carotid Bruit] : no carotid bruit [Normal S1, S2] : normal S1, S2 [No Murmur] : no murmur [No Rub] : no rub [No Gallop] : no gallop [Murmur] : murmur [Good Air Entry] : good air entry [Clear Lung Fields] : clear lung fields [No Respiratory Distress] : no respiratory distress  [Soft] : abdomen soft [Non Tender] : non-tender [No Masses/organomegaly] : no masses/organomegaly [Normal Bowel Sounds] : normal bowel sounds [No Edema] : no edema [Normal Gait] : normal gait [No Cyanosis] : no cyanosis [No Clubbing] : no clubbing [No Varicosities] : no varicosities [No Rash] : no rash [Moves all extremities] : moves all extremities [No Skin Lesions] : no skin lesions [No Focal Deficits] : no focal deficits [Normal Speech] : normal speech [Alert and Oriented] : alert and oriented [Normal memory] : normal memory [de-identified] : 2/6 catalina

## 2021-06-30 NOTE — DISCUSSION/SUMMARY
[FreeTextEntry1] : MVR ok\par add amlodapine for BP\par f/u in a month\par unable to do treadmill today due to low back pain\par INR = 3.5. same dose repeat in 2 weeks

## 2021-06-30 NOTE — HISTORY OF PRESENT ILLNESS
[FreeTextEntry1] : remains in nsr\par still on sotalol/beta now\par BP running high\par no cp \par occ palps but nothing sustained- following w ep\par no edema or orthop

## 2021-07-06 ENCOUNTER — NON-APPOINTMENT (OUTPATIENT)
Age: 71
End: 2021-07-06

## 2021-07-21 ENCOUNTER — RX RENEWAL (OUTPATIENT)
Age: 71
End: 2021-07-21

## 2021-07-26 LAB — INR PPP: 3.05

## 2021-07-28 ENCOUNTER — APPOINTMENT (OUTPATIENT)
Dept: HEART AND VASCULAR | Facility: CLINIC | Age: 71
End: 2021-07-28
Payer: MEDICARE

## 2021-07-28 VITALS
DIASTOLIC BLOOD PRESSURE: 100 MMHG | HEART RATE: 70 BPM | TEMPERATURE: 98.6 F | BODY MASS INDEX: 27.49 KG/M2 | SYSTOLIC BLOOD PRESSURE: 150 MMHG | WEIGHT: 161 LBS | OXYGEN SATURATION: 95 % | HEIGHT: 64 IN | RESPIRATION RATE: 14 BRPM

## 2021-07-28 DIAGNOSIS — I10 ESSENTIAL (PRIMARY) HYPERTENSION: ICD-10-CM

## 2021-07-28 LAB
INR PPP: 2.2 RATIO
POCT-PROTHROMBIN TIME: 26.4 SECS

## 2021-07-28 PROCEDURE — 99214 OFFICE O/P EST MOD 30 MIN: CPT

## 2021-07-28 PROCEDURE — 93000 ELECTROCARDIOGRAM COMPLETE: CPT

## 2021-07-28 NOTE — HISTORY OF PRESENT ILLNESS
[FreeTextEntry1] : remains in nsr\par still on sotalol/beta now\par BP running high\par no cp \par occ palps but nothing sustained- following w ep\par no edema or orthop\par unable to tolerate amlodapine- felt tachycardic so stopped it and felt better

## 2021-07-28 NOTE — PHYSICAL EXAM
[Well Developed] : well developed [Well Nourished] : well nourished [No Acute Distress] : no acute distress [Normal Conjunctiva] : normal conjunctiva [Normal Venous Pressure] : normal venous pressure [No Carotid Bruit] : no carotid bruit [Normal S1, S2] : normal S1, S2 [No Murmur] : no murmur [No Rub] : no rub [No Gallop] : no gallop [Murmur] : murmur [Clear Lung Fields] : clear lung fields [Good Air Entry] : good air entry [No Respiratory Distress] : no respiratory distress  [Soft] : abdomen soft [Non Tender] : non-tender [No Masses/organomegaly] : no masses/organomegaly [Normal Bowel Sounds] : normal bowel sounds [Normal Gait] : normal gait [No Edema] : no edema [No Cyanosis] : no cyanosis [No Clubbing] : no clubbing [No Varicosities] : no varicosities [No Rash] : no rash [No Skin Lesions] : no skin lesions [Moves all extremities] : moves all extremities [No Focal Deficits] : no focal deficits [Normal Speech] : normal speech [Alert and Oriented] : alert and oriented [Normal memory] : normal memory [de-identified] : 2/6 catalina

## 2021-07-28 NOTE — DISCUSSION/SUMMARY
[FreeTextEntry1] : unable to tolerate amlodapine- try hctz 25qd\par INR 2.2, coumadin reviewed\par f/u 1 mo

## 2021-08-19 ENCOUNTER — APPOINTMENT (OUTPATIENT)
Dept: HEART AND VASCULAR | Facility: CLINIC | Age: 71
End: 2021-08-19
Payer: MEDICARE

## 2021-08-19 VITALS
HEIGHT: 64 IN | TEMPERATURE: 97 F | HEART RATE: 79 BPM | BODY MASS INDEX: 27.14 KG/M2 | RESPIRATION RATE: 14 BRPM | OXYGEN SATURATION: 91 % | DIASTOLIC BLOOD PRESSURE: 70 MMHG | SYSTOLIC BLOOD PRESSURE: 126 MMHG | WEIGHT: 159 LBS

## 2021-08-19 LAB
INR PPP: 3.7 RATIO
POCT-PROTHROMBIN TIME: 44.5 SECS

## 2021-08-19 PROCEDURE — 99214 OFFICE O/P EST MOD 30 MIN: CPT

## 2021-08-19 NOTE — HISTORY OF PRESENT ILLNESS
[FreeTextEntry1] : remains in nsr\par still on sotalol/beta now\par BP better\par no cp \par occ palps but nothing sustained- following w ep\par no edema or orthop\par tolerating hctz

## 2021-08-19 NOTE — DISCUSSION/SUMMARY
[FreeTextEntry1] : inr 3.7- will hold 1 day, then same\par BP better cont on current rx\par PAF- will cont sotalol for now, has f/u w  M\par see me back in 3 mo\par

## 2021-09-27 ENCOUNTER — RX RENEWAL (OUTPATIENT)
Age: 71
End: 2021-09-27

## 2021-09-29 ENCOUNTER — APPOINTMENT (OUTPATIENT)
Dept: HEART AND VASCULAR | Facility: CLINIC | Age: 71
End: 2021-09-29
Payer: MEDICARE

## 2021-09-29 ENCOUNTER — NON-APPOINTMENT (OUTPATIENT)
Age: 71
End: 2021-09-29

## 2021-09-29 VITALS
HEIGHT: 64 IN | HEART RATE: 116 BPM | BODY MASS INDEX: 27.83 KG/M2 | SYSTOLIC BLOOD PRESSURE: 122 MMHG | DIASTOLIC BLOOD PRESSURE: 82 MMHG | WEIGHT: 163 LBS

## 2021-09-29 PROCEDURE — 93000 ELECTROCARDIOGRAM COMPLETE: CPT

## 2021-09-29 PROCEDURE — 99214 OFFICE O/P EST MOD 30 MIN: CPT

## 2021-09-29 NOTE — PHYSICAL EXAM
[Well Developed] : well developed [Well Nourished] : well nourished [No Acute Distress] : no acute distress [Normal Conjunctiva] : normal conjunctiva [Normal Venous Pressure] : normal venous pressure [No Carotid Bruit] : no carotid bruit [Normal S1, S2] : normal S1, S2 [No Murmur] : no murmur [No Rub] : no rub [No Gallop] : no gallop [Clear Lung Fields] : clear lung fields [Good Air Entry] : good air entry [No Respiratory Distress] : no respiratory distress  [Soft] : abdomen soft [Normal Bowel Sounds] : normal bowel sounds [Normal Gait] : normal gait [No Edema] : no edema [No Cyanosis] : no cyanosis [No Varicosities] : no varicosities [No Rash] : no rash [No Skin Lesions] : no skin lesions [Moves all extremities] : moves all extremities [No Focal Deficits] : no focal deficits [Normal Speech] : normal speech [Alert and Oriented] : alert and oriented [Normal memory] : normal memory

## 2021-10-04 ENCOUNTER — RX RENEWAL (OUTPATIENT)
Age: 71
End: 2021-10-04

## 2021-10-04 NOTE — REVIEW OF SYSTEMS
[Feeling Fatigued] : feeling fatigued [Negative] : Heme/Lymph [Fever] : no fever [Headache] : no headache [Chills] : no chills [SOB] : no shortness of breath [Palpitations] : no palpitations [Syncope] : no syncope

## 2021-10-04 NOTE — DISCUSSION/SUMMARY
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  She is in an atypical atrial flutter today with mild symptoms on Sotalol and Coumadin.  INRs therapeutic over the past few months.  I have asked her to proceed with a cardioversion (options include rate control, or a rhythm control strategy with a DCCV or repeat ablation.  We discussed the procedure in detail including risks, benefits and alternatives.  She is amenable and was given pr procedure instructions.   She knows to call with any questions or concerns.

## 2021-10-04 NOTE — HISTORY OF PRESENT ILLNESS
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 9/2018 with palpitations in aflutter with a ventricular rate up to 140 bpm and underwent a cardioversion. Echo showed EF of 50-55% and moderate pHTN.  She had recurrent atrial fibrillation 5/2020 s/p cardioversion and again 8/2020 s/p cardioversion. She has been resistant to an ablation, but ultimately decided to proceed and underwent a pulmonary vein isolation and atrial flutter ablation 11/2020. Since then she has been doing well. She gets palpitations (flutter-like) for only few seconds, mild fatigue, but denied any SOB, syncope, near syncope or edema. She is compliant with Coumadin with therapeutic INR levels. She is on Sotalol 60 BID and Toprol 50 qD. \par \par She presents for routine follow up and overall is doing well.  No palpitations, chest pain, SOB, syncope, near syncope, orthopnea, PND.  She notes some fatigue.   \par

## 2021-10-04 NOTE — CARDIOLOGY SUMMARY
[___] : [unfilled] [LVEF ___%] : LVEF [unfilled]% [None] : no pulmonary hypertension [Enlarged] : enlarged LA size [Mild] : mild mitral regurgitation [de-identified] : 9/29/21 atypical flutter with variable conduction rate 104

## 2021-10-06 ENCOUNTER — NON-APPOINTMENT (OUTPATIENT)
Age: 71
End: 2021-10-06

## 2021-10-06 ENCOUNTER — OUTPATIENT (OUTPATIENT)
Dept: OUTPATIENT SERVICES | Facility: HOSPITAL | Age: 71
LOS: 1 days | Discharge: HOME | End: 2021-10-06
Payer: MEDICARE

## 2021-10-06 DIAGNOSIS — Z95.2 PRESENCE OF PROSTHETIC HEART VALVE: Chronic | ICD-10-CM

## 2021-10-06 PROCEDURE — 92960 CARDIOVERSION ELECTRIC EXT: CPT

## 2021-10-07 ENCOUNTER — RX RENEWAL (OUTPATIENT)
Age: 71
End: 2021-10-07

## 2021-10-08 ENCOUNTER — RX RENEWAL (OUTPATIENT)
Age: 71
End: 2021-10-08

## 2021-10-19 ENCOUNTER — RX RENEWAL (OUTPATIENT)
Age: 71
End: 2021-10-19

## 2021-11-01 ENCOUNTER — RX RENEWAL (OUTPATIENT)
Age: 71
End: 2021-11-01

## 2021-12-13 LAB — INR PPP: 2.9 RATIO

## 2022-01-04 ENCOUNTER — RX RENEWAL (OUTPATIENT)
Age: 72
End: 2022-01-04

## 2022-01-10 ENCOUNTER — RX RENEWAL (OUTPATIENT)
Age: 72
End: 2022-01-10

## 2022-01-18 ENCOUNTER — RX RENEWAL (OUTPATIENT)
Age: 72
End: 2022-01-18

## 2022-01-18 RX ORDER — AMLODIPINE BESYLATE 5 MG/1
5 TABLET ORAL DAILY
Qty: 90 | Refills: 0 | Status: DISCONTINUED | COMMUNITY
Start: 2021-06-30 | End: 2022-01-18

## 2022-02-23 ENCOUNTER — APPOINTMENT (OUTPATIENT)
Dept: HEART AND VASCULAR | Facility: CLINIC | Age: 72
End: 2022-02-23
Payer: MEDICARE

## 2022-02-23 VITALS
HEIGHT: 64 IN | OXYGEN SATURATION: 97 % | TEMPERATURE: 97.2 F | HEART RATE: 101 BPM | DIASTOLIC BLOOD PRESSURE: 82 MMHG | WEIGHT: 155 LBS | BODY MASS INDEX: 26.46 KG/M2 | SYSTOLIC BLOOD PRESSURE: 118 MMHG

## 2022-02-23 DIAGNOSIS — I48.0 PAROXYSMAL ATRIAL FIBRILLATION: ICD-10-CM

## 2022-02-23 LAB
INR PPP: 3.9 RATIO
POCT-PROTHROMBIN TIME: 47.1 SECS

## 2022-02-23 PROCEDURE — 36415 COLL VENOUS BLD VENIPUNCTURE: CPT

## 2022-02-23 PROCEDURE — 99214 OFFICE O/P EST MOD 30 MIN: CPT

## 2022-02-23 PROCEDURE — 93000 ELECTROCARDIOGRAM COMPLETE: CPT

## 2022-02-23 NOTE — HISTORY OF PRESENT ILLNESS
[FreeTextEntry1] : f/u AF, HTN\par still on sotalol/beta now\par BP better\par no cp \par occ palps but nothing sustained- following w ep\par no edema or orthop\par tolerating BP rx's

## 2022-02-23 NOTE — DISCUSSION/SUMMARY
[FreeTextEntry1] : in AF but rate is fine and AC'd\par BP much better\par check labs\par inr 3.9- hold 1 day, then same\par see me 3 mo

## 2022-02-24 LAB
ALBUMIN SERPL ELPH-MCNC: 4.6 G/DL
ALP BLD-CCNC: 85 U/L
ALT SERPL-CCNC: 8 U/L
ANION GAP SERPL CALC-SCNC: 15 MMOL/L
AST SERPL-CCNC: 17 U/L
BASOPHILS # BLD AUTO: 0.08 K/UL
BASOPHILS NFR BLD AUTO: 0.9 %
BILIRUB SERPL-MCNC: 0.6 MG/DL
BUN SERPL-MCNC: 42 MG/DL
CALCIUM SERPL-MCNC: 10.4 MG/DL
CHLORIDE SERPL-SCNC: 104 MMOL/L
CHOLEST SERPL-MCNC: 215 MG/DL
CO2 SERPL-SCNC: 20 MMOL/L
CREAT SERPL-MCNC: 1.42 MG/DL
EOSINOPHIL # BLD AUTO: 0.24 K/UL
EOSINOPHIL NFR BLD AUTO: 2.8 %
ESTIMATED AVERAGE GLUCOSE: 117 MG/DL
GLUCOSE SERPL-MCNC: 85 MG/DL
HBA1C MFR BLD HPLC: 5.7 %
HCT VFR BLD CALC: 41.1 %
HDLC SERPL-MCNC: 58 MG/DL
HGB BLD-MCNC: 12.5 G/DL
IMM GRANULOCYTES NFR BLD AUTO: 0.6 %
LDLC SERPL CALC-MCNC: 143 MG/DL
LYMPHOCYTES # BLD AUTO: 1.59 K/UL
LYMPHOCYTES NFR BLD AUTO: 18.4 %
MAN DIFF?: NORMAL
MCHC RBC-ENTMCNC: 28.2 PG
MCHC RBC-ENTMCNC: 30.4 GM/DL
MCV RBC AUTO: 92.6 FL
MONOCYTES # BLD AUTO: 0.88 K/UL
MONOCYTES NFR BLD AUTO: 10.2 %
NEUTROPHILS # BLD AUTO: 5.8 K/UL
NEUTROPHILS NFR BLD AUTO: 67.1 %
NONHDLC SERPL-MCNC: 157 MG/DL
PLATELET # BLD AUTO: 272 K/UL
POTASSIUM SERPL-SCNC: 4.9 MMOL/L
PROT SERPL-MCNC: 7.1 G/DL
RBC # BLD: 4.44 M/UL
RBC # FLD: 14.6 %
SODIUM SERPL-SCNC: 139 MMOL/L
TRIGL SERPL-MCNC: 70 MG/DL
TSH SERPL-ACNC: 2.2 UIU/ML
WBC # FLD AUTO: 8.64 K/UL

## 2022-03-30 ENCOUNTER — APPOINTMENT (OUTPATIENT)
Dept: HEART AND VASCULAR | Facility: CLINIC | Age: 72
End: 2022-03-30
Payer: MEDICARE

## 2022-03-30 ENCOUNTER — NON-APPOINTMENT (OUTPATIENT)
Age: 72
End: 2022-03-30

## 2022-03-30 ENCOUNTER — RX RENEWAL (OUTPATIENT)
Age: 72
End: 2022-03-30

## 2022-03-30 VITALS
WEIGHT: 155 LBS | HEIGHT: 64 IN | DIASTOLIC BLOOD PRESSURE: 78 MMHG | SYSTOLIC BLOOD PRESSURE: 129 MMHG | BODY MASS INDEX: 26.46 KG/M2 | HEART RATE: 95 BPM

## 2022-03-30 PROCEDURE — 93000 ELECTROCARDIOGRAM COMPLETE: CPT

## 2022-03-30 PROCEDURE — 99214 OFFICE O/P EST MOD 30 MIN: CPT

## 2022-03-30 RX ORDER — METOPROLOL SUCCINATE 50 MG/1
50 TABLET, EXTENDED RELEASE ORAL
Qty: 90 | Refills: 0 | Status: DISCONTINUED | COMMUNITY
Start: 2021-11-01 | End: 2022-03-30

## 2022-03-30 RX ORDER — SOTALOL HYDROCHLORIDE 120 MG/1
120 TABLET ORAL
Qty: 90 | Refills: 2 | Status: DISCONTINUED | COMMUNITY
Start: 2020-11-25 | End: 2022-03-30

## 2022-03-31 NOTE — REVIEW OF SYSTEMS
[Feeling Fatigued] : feeling fatigued [Negative] : Heme/Lymph [Fever] : no fever [Headache] : no headache [Chills] : no chills [SOB] : no shortness of breath [Chest Discomfort] : no chest discomfort [Palpitations] : no palpitations [Orthopnea] : no orthopnea [Syncope] : no syncope

## 2022-03-31 NOTE — ADDENDUM
[FreeTextEntry1] :  I, Jason Teresa, hereby attest that the medical record entry for this patient accurately reflects signatures/notations that I made on the Date of Service in my capacity as an Attending Physician when I treated/diagnosed the above patient. I do hereby attest that this information is true, accurate and complete to the best of my knowledge.  I was present for the entire visit and supervised the entire visit and made/agree with the plan as outlined.\par \par

## 2022-03-31 NOTE — DISCUSSION/SUMMARY
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  She is back in an arrhythmia and states she noted no change in the way she felt post cardioversion.  We discussed options of pursuing rate control, a repeat cardioversion or an ablation.  We discussed the pros / drawbacks of each option.  She would like to pursue a rate control strategy as she is overall feeling well and not interested in a repeat ablation.  We will stop her Sotalol and triple her Metoprolol. She will follow up in 2 weeks at which time we will place a event monitor on her to see her heart rates and revisit if she wants to pursue a rhythm control strategy.  She will remain on Coumadin.  She knows to call with any questions or concerns.

## 2022-03-31 NOTE — HISTORY OF PRESENT ILLNESS
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 9/2018 with palpitations in aflutter with a ventricular rate up to 140 bpm and underwent a cardioversion. Echo showed EF of 50-55% and moderate pHTN.  She had recurrent atrial fibrillation 5/2020 s/p cardioversion and again 8/2020 s/p cardioversion. She has been resistant to an ablation, but ultimately decided to proceed and underwent a pulmonary vein isolation and atrial flutter ablation 11/2020. s/p DCCV 10/2021\par \par She presents for follow up after Since then she has been doing well. She notes no change in the way she feels since her last DCCV.  She has chronic fatigue but does not feel like this has changed.  She is on Sotalol and compliant with Coumadin with therapeutic INR levels.   No palpitations, chest pain, SOB, syncope, near syncope, orthopnea, PND.  \par \par

## 2022-03-31 NOTE — PHYSICAL EXAM
[Well Developed] : well developed [Well Nourished] : well nourished [No Acute Distress] : no acute distress [Normal Conjunctiva] : normal conjunctiva [Normal Venous Pressure] : normal venous pressure [No Carotid Bruit] : no carotid bruit [Clear Lung Fields] : clear lung fields [Good Air Entry] : good air entry [No Respiratory Distress] : no respiratory distress  [Soft] : abdomen soft [Normal Bowel Sounds] : normal bowel sounds [Normal Gait] : normal gait [No Edema] : no edema [No Cyanosis] : no cyanosis [No Varicosities] : no varicosities [No Rash] : no rash [Moves all extremities] : moves all extremities [No Focal Deficits] : no focal deficits [Normal Speech] : normal speech [Alert and Oriented] : alert and oriented [Normal memory] : normal memory [Normal Rate] : normal [Irregularly Irregular] : irregularly irregular

## 2022-03-31 NOTE — CARDIOLOGY SUMMARY
[___] : [unfilled] [LVEF ___%] : LVEF [unfilled]% [None] : no pulmonary hypertension [Enlarged] : enlarged LA size [Mild] : mild mitral regurgitation [de-identified] : 9/29/21 atypical flutter with variable conduction rate 104\par 3/30/22 afib with a ventricular rate of 115

## 2022-04-11 ENCOUNTER — RX RENEWAL (OUTPATIENT)
Age: 72
End: 2022-04-11

## 2022-04-11 RX ORDER — WARFARIN 5 MG/1
5 TABLET ORAL
Qty: 90 | Refills: 0 | Status: ACTIVE | COMMUNITY
Start: 2020-08-24 | End: 1900-01-01

## 2022-04-11 RX ORDER — HYDROCHLOROTHIAZIDE 25 MG/1
25 TABLET ORAL DAILY
Qty: 90 | Refills: 0 | Status: ACTIVE | COMMUNITY
Start: 2021-07-28 | End: 1900-01-01

## 2022-04-13 ENCOUNTER — NON-APPOINTMENT (OUTPATIENT)
Age: 72
End: 2022-04-13

## 2022-04-13 ENCOUNTER — APPOINTMENT (OUTPATIENT)
Dept: HEART AND VASCULAR | Facility: CLINIC | Age: 72
End: 2022-04-13
Payer: MEDICARE

## 2022-04-13 VITALS
HEART RATE: 112 BPM | OXYGEN SATURATION: 100 % | SYSTOLIC BLOOD PRESSURE: 123 MMHG | TEMPERATURE: 95.6 F | DIASTOLIC BLOOD PRESSURE: 90 MMHG

## 2022-04-13 PROCEDURE — 99214 OFFICE O/P EST MOD 30 MIN: CPT

## 2022-04-13 PROCEDURE — 93000 ELECTROCARDIOGRAM COMPLETE: CPT

## 2022-04-13 NOTE — PHYSICAL EXAM
[Well Developed] : well developed [Well Nourished] : well nourished [No Acute Distress] : no acute distress [Normal Conjunctiva] : normal conjunctiva [Normal Venous Pressure] : normal venous pressure [No Carotid Bruit] : no carotid bruit [Normal Rate] : normal [Irregularly Irregular] : irregularly irregular [Clear Lung Fields] : clear lung fields [Good Air Entry] : good air entry [No Respiratory Distress] : no respiratory distress  [Normal Bowel Sounds] : normal bowel sounds [Normal Gait] : normal gait [No Edema] : no edema [No Varicosities] : no varicosities [No Rash] : no rash [Moves all extremities] : moves all extremities [No Focal Deficits] : no focal deficits [Normal Speech] : normal speech [Alert and Oriented] : alert and oriented [Normal memory] : normal memory

## 2022-04-18 NOTE — DISCUSSION/SUMMARY
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  She is back in an arrhythmia with rapid rates.  I have offered her a cardioversion tomorrow vs. adjusting medications and  proceeding with an ablation.  She will increase her Metoprolol to 50mg BID.  Plan for an ablation in 2 weeks.  She will come in next week for a heart rate check.  She will have an INR check next week.   We discussed the procedure in detail including risks, benefits and alternatives. I have quoted him an approximately 70% chance for success and a 1:700 chance of major complication related to the procedure.  Risks including, but not limited to; infection, vascular injury, cardiac perforation, TE/CVA, phrenic nerve injury were discussed.  All questions answered.  She was given pre procedure instructions and will call us if her heart rate is elevated in the next week on new dose of Toprol.  She knows to call with any questions or concerns.

## 2022-04-18 NOTE — REVIEW OF SYSTEMS
[Feeling Fatigued] : feeling fatigued [Negative] : Heme/Lymph [Anxiety] : anxiety [Fever] : no fever [Headache] : no headache [Chills] : no chills [SOB] : no shortness of breath [Dyspnea on exertion] : not dyspnea during exertion [Chest Discomfort] : no chest discomfort [Palpitations] : no palpitations [Orthopnea] : no orthopnea [Syncope] : no syncope

## 2022-04-18 NOTE — HISTORY OF PRESENT ILLNESS
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 Echo showed EF of 50-55% and moderate pHTN.  She had recurrent atrial fibrillation 5/2020 s/p cardioversion and again 8/2020 s/p cardioversion. She has been resistant to an ablation, but ultimately decided to proceed and underwent a pulmonary vein isolation and atrial flutter ablation 11/2020. s/p DCCV 10/2021\par \par She was seen last week back in afib but did not note a significant change in the way she felt.  Sotalol was stopped and her MEtoprolol was increased. She states she did not take her Metoprolol today and is extremely anxious.  She continues to deny any palpitations, chest pain, SOB, syncope, near syncope, orthopnea, PND.  SHe is on Coumadin with therapeutic INR levels.

## 2022-04-18 NOTE — CARDIOLOGY SUMMARY
[___] : [unfilled] [LVEF ___%] : LVEF [unfilled]% [None] : no pulmonary hypertension [Enlarged] : enlarged LA size [Mild] : mild mitral regurgitation [de-identified] : 9/29/21 atypical flutter with variable conduction rate 104\par 3/30/22 afib with a ventricular rate of 115\par 4/138/22 - afib with a ventricular rate of 157

## 2022-04-25 NOTE — ED PROVIDER NOTE - CPE EDP SKIN NORM
Body mass index is 31.67 kg/m². Morbid obesity complicates all aspects of disease management from diagnostic modalities to treatment. Weight loss encouraged and health benefits explained to patient.        normal...

## 2022-05-04 LAB — INR PPP: 2.65

## 2022-05-17 ENCOUNTER — RX RENEWAL (OUTPATIENT)
Age: 72
End: 2022-05-17

## 2022-05-19 DIAGNOSIS — U07.1 COVID-19: ICD-10-CM

## 2022-05-19 RX ORDER — NIRMATRELVIR AND RITONAVIR 300-100 MG
20 X 150 MG & KIT ORAL
Qty: 1 | Refills: 0 | Status: ACTIVE | COMMUNITY
Start: 2022-05-19 | End: 1900-01-01

## 2022-06-08 ENCOUNTER — APPOINTMENT (OUTPATIENT)
Dept: HEART AND VASCULAR | Facility: CLINIC | Age: 72
End: 2022-06-08
Payer: MEDICARE

## 2022-06-08 VITALS
SYSTOLIC BLOOD PRESSURE: 126 MMHG | OXYGEN SATURATION: 98 % | DIASTOLIC BLOOD PRESSURE: 84 MMHG | HEIGHT: 64 IN | TEMPERATURE: 98 F | RESPIRATION RATE: 14 BRPM | BODY MASS INDEX: 26.63 KG/M2 | HEART RATE: 118 BPM | WEIGHT: 156 LBS

## 2022-06-08 DIAGNOSIS — I48.91 UNSPECIFIED ATRIAL FIBRILLATION: ICD-10-CM

## 2022-06-08 LAB
INR PPP: 3.3 RATIO
POCT-PROTHROMBIN TIME: 39.5 SECS

## 2022-06-08 PROCEDURE — 99213 OFFICE O/P EST LOW 20 MIN: CPT

## 2022-06-08 NOTE — HISTORY OF PRESENT ILLNESS
[FreeTextEntry1] : f/u AF, HTN\par recovering from covid- still dry cough\par didn’t take paxlovid\par BP better\par no cp \par occ palps but nothing sustained- following w ep\par no edema or orthop\par

## 2022-06-08 NOTE — DISCUSSION/SUMMARY
[FreeTextEntry1] : inr 3.3- same dose\par bp ok\par will resched rfa w ep now that recovered from covid

## 2022-06-21 ENCOUNTER — APPOINTMENT (OUTPATIENT)
Dept: HEART AND VASCULAR | Facility: CLINIC | Age: 72
End: 2022-06-21

## 2022-07-06 LAB
INR PPP: 4.49 RATIO
PT BLD: 53.8 SEC

## 2022-07-08 ENCOUNTER — INPATIENT (INPATIENT)
Facility: HOSPITAL | Age: 72
LOS: 0 days | Discharge: HOME | DRG: 274 | End: 2022-07-09
Attending: INTERNAL MEDICINE | Admitting: INTERNAL MEDICINE
Payer: MEDICARE

## 2022-07-08 VITALS
RESPIRATION RATE: 16 BRPM | TEMPERATURE: 97 F | DIASTOLIC BLOOD PRESSURE: 86 MMHG | HEART RATE: 120 BPM | OXYGEN SATURATION: 98 % | SYSTOLIC BLOOD PRESSURE: 132 MMHG

## 2022-07-08 DIAGNOSIS — Z95.2 PRESENCE OF PROSTHETIC HEART VALVE: Chronic | ICD-10-CM

## 2022-07-08 DIAGNOSIS — Z98.890 OTHER SPECIFIED POSTPROCEDURAL STATES: Chronic | ICD-10-CM

## 2022-07-08 LAB
ALBUMIN SERPL ELPH-MCNC: 3 G/DL — LOW (ref 3.3–5)
ALP SERPL-CCNC: 63 U/L — SIGNIFICANT CHANGE UP (ref 40–120)
ALT FLD-CCNC: 10 U/L — SIGNIFICANT CHANGE UP (ref 10–45)
ANION GAP SERPL CALC-SCNC: 16 MMOL/L — SIGNIFICANT CHANGE UP (ref 5–17)
ANION GAP SERPL CALC-SCNC: 16 MMOL/L — SIGNIFICANT CHANGE UP (ref 5–17)
APTT BLD: 58.3 SEC — HIGH (ref 27.5–35.5)
AST SERPL-CCNC: 20 U/L — SIGNIFICANT CHANGE UP (ref 10–40)
BILIRUB SERPL-MCNC: 0.4 MG/DL — SIGNIFICANT CHANGE UP (ref 0.2–1.2)
BLD GP AB SCN SERPL QL: POSITIVE — SIGNIFICANT CHANGE UP
BUN SERPL-MCNC: 30 MG/DL — HIGH (ref 7–23)
BUN SERPL-MCNC: 38 MG/DL — HIGH (ref 7–23)
CALCIUM SERPL-MCNC: 10.3 MG/DL — SIGNIFICANT CHANGE UP (ref 8.4–10.5)
CALCIUM SERPL-MCNC: 8.6 MG/DL — SIGNIFICANT CHANGE UP (ref 8.4–10.5)
CHLORIDE SERPL-SCNC: 102 MMOL/L — SIGNIFICANT CHANGE UP (ref 96–108)
CHLORIDE SERPL-SCNC: 105 MMOL/L — SIGNIFICANT CHANGE UP (ref 96–108)
CO2 SERPL-SCNC: 16 MMOL/L — LOW (ref 22–31)
CO2 SERPL-SCNC: 20 MMOL/L — LOW (ref 22–31)
CREAT SERPL-MCNC: 1.4 MG/DL — HIGH (ref 0.5–1.3)
CREAT SERPL-MCNC: 1.66 MG/DL — HIGH (ref 0.5–1.3)
EGFR: 33 ML/MIN/1.73M2 — LOW
EGFR: 40 ML/MIN/1.73M2 — LOW
GLUCOSE SERPL-MCNC: 84 MG/DL — SIGNIFICANT CHANGE UP (ref 70–99)
GLUCOSE SERPL-MCNC: 97 MG/DL — SIGNIFICANT CHANGE UP (ref 70–99)
HCT VFR BLD CALC: 29 % — LOW (ref 34.5–45)
HCT VFR BLD CALC: 37.8 % — SIGNIFICANT CHANGE UP (ref 34.5–45)
HGB BLD-MCNC: 12.2 G/DL — SIGNIFICANT CHANGE UP (ref 11.5–15.5)
HGB BLD-MCNC: 9.3 G/DL — LOW (ref 11.5–15.5)
INR BLD: 5.05 — CRITICAL HIGH (ref 0.88–1.16)
ISTAT ACTK (ACTIVATED CLOTTING TIME KAOLIN): 179 SEC — HIGH (ref 74–137)
ISTAT ACTK (ACTIVATED CLOTTING TIME KAOLIN): 341 SEC — HIGH (ref 74–137)
ISTAT ACTK (ACTIVATED CLOTTING TIME KAOLIN): 347 SEC — HIGH (ref 74–137)
ISTAT ACTK (ACTIVATED CLOTTING TIME KAOLIN): 364 SEC — HIGH (ref 74–137)
ISTAT ACTK (ACTIVATED CLOTTING TIME KAOLIN): 375 SEC — HIGH (ref 74–137)
ISTAT ACTK (ACTIVATED CLOTTING TIME KAOLIN): 376 SEC — HIGH (ref 74–137)
ISTAT ACTK (ACTIVATED CLOTTING TIME KAOLIN): 387 SEC — HIGH (ref 74–137)
ISTAT ARTERIAL BE: -9 MMOL/L — LOW (ref -2–3)
ISTAT ARTERIAL GLUCOSE: 90 MG/DL — SIGNIFICANT CHANGE UP (ref 70–99)
ISTAT ARTERIAL HCO3: 18 MMOL/L — LOW (ref 22–26)
ISTAT ARTERIAL HEMATOCRIT: 31 % — LOW (ref 34.5–45)
ISTAT ARTERIAL HEMOGLOBIN: 10.5 G/DL — LOW (ref 11.5–15.5)
ISTAT ARTERIAL IONIZED CALCIUM: 1.21 MMOL/L — SIGNIFICANT CHANGE UP (ref 1.12–1.3)
ISTAT ARTERIAL PCO2: 41 MMHG — SIGNIFICANT CHANGE UP (ref 35–45)
ISTAT ARTERIAL PH: 7.25 — LOW (ref 7.35–7.45)
ISTAT ARTERIAL PO2: 226 MMHG — HIGH (ref 80–105)
ISTAT ARTERIAL POTASSIUM: 3.8 MMOL/L — SIGNIFICANT CHANGE UP (ref 3.5–5.3)
ISTAT ARTERIAL SO2: 100 % — HIGH (ref 95–98)
ISTAT ARTERIAL SODIUM: 138 MMOL/L — SIGNIFICANT CHANGE UP (ref 135–145)
ISTAT ARTERIAL TCO2: 19 MMOL/L — LOW (ref 22–31)
ISTAT INR: 4.8 — HIGH (ref 0.88–1.16)
ISTAT PT: 53.5 SEC — HIGH (ref 10–12.9)
ISTAT VENOUS BE: -1 MMOL/L — SIGNIFICANT CHANGE UP (ref -2–3)
ISTAT VENOUS GLUCOSE: 85 MG/DL — SIGNIFICANT CHANGE UP (ref 70–99)
ISTAT VENOUS HCO3: 24 MMOL/L — SIGNIFICANT CHANGE UP (ref 23–28)
ISTAT VENOUS HEMATOCRIT: 40 % — SIGNIFICANT CHANGE UP (ref 34.5–45)
ISTAT VENOUS HEMOGLOBIN: 13.6 GM/DL — SIGNIFICANT CHANGE UP (ref 11.5–15.5)
ISTAT VENOUS IONIZED CALCIUM: 1.17 MMOL/L — SIGNIFICANT CHANGE UP (ref 1.12–1.3)
ISTAT VENOUS PCO2: 41 MMHG — SIGNIFICANT CHANGE UP (ref 41–51)
ISTAT VENOUS PH: 7.38 — SIGNIFICANT CHANGE UP (ref 7.31–7.41)
ISTAT VENOUS PO2: <66 MMHG — LOW (ref 35–40)
ISTAT VENOUS POTASSIUM: 4.3 MMOL/L — SIGNIFICANT CHANGE UP (ref 3.5–5.3)
ISTAT VENOUS SO2: 56 % — SIGNIFICANT CHANGE UP
ISTAT VENOUS SODIUM: 137 MMOL/L — SIGNIFICANT CHANGE UP (ref 135–145)
ISTAT VENOUS TCO2: 25 MMOL/L — SIGNIFICANT CHANGE UP (ref 22–31)
MAGNESIUM SERPL-MCNC: 1.6 MG/DL — SIGNIFICANT CHANGE UP (ref 1.6–2.6)
MCHC RBC-ENTMCNC: 28.5 PG — SIGNIFICANT CHANGE UP (ref 27–34)
MCHC RBC-ENTMCNC: 28.7 PG — SIGNIFICANT CHANGE UP (ref 27–34)
MCHC RBC-ENTMCNC: 32.1 GM/DL — SIGNIFICANT CHANGE UP (ref 32–36)
MCHC RBC-ENTMCNC: 32.3 GM/DL — SIGNIFICANT CHANGE UP (ref 32–36)
MCV RBC AUTO: 88.3 FL — SIGNIFICANT CHANGE UP (ref 80–100)
MCV RBC AUTO: 89.5 FL — SIGNIFICANT CHANGE UP (ref 80–100)
NRBC # BLD: 0 /100 WBCS — SIGNIFICANT CHANGE UP (ref 0–0)
NRBC # BLD: 0 /100 WBCS — SIGNIFICANT CHANGE UP (ref 0–0)
PHOSPHATE SERPL-MCNC: 3.4 MG/DL — SIGNIFICANT CHANGE UP (ref 2.5–4.5)
PLATELET # BLD AUTO: 186 K/UL — SIGNIFICANT CHANGE UP (ref 150–400)
PLATELET # BLD AUTO: 266 K/UL — SIGNIFICANT CHANGE UP (ref 150–400)
POCT ISTAT CREATININE: 1.6 MG/DL — HIGH (ref 0.5–1.3)
POTASSIUM SERPL-MCNC: 4 MMOL/L — SIGNIFICANT CHANGE UP (ref 3.5–5.3)
POTASSIUM SERPL-MCNC: 4.3 MMOL/L — SIGNIFICANT CHANGE UP (ref 3.5–5.3)
POTASSIUM SERPL-SCNC: 4 MMOL/L — SIGNIFICANT CHANGE UP (ref 3.5–5.3)
POTASSIUM SERPL-SCNC: 4.3 MMOL/L — SIGNIFICANT CHANGE UP (ref 3.5–5.3)
PROT SERPL-MCNC: 5.4 G/DL — LOW (ref 6–8.3)
PROTHROM AB SERPL-ACNC: 61.1 SEC — HIGH (ref 10.5–13.4)
RBC # BLD: 3.24 M/UL — LOW (ref 3.8–5.2)
RBC # BLD: 4.28 M/UL — SIGNIFICANT CHANGE UP (ref 3.8–5.2)
RBC # FLD: 14 % — SIGNIFICANT CHANGE UP (ref 10.3–14.5)
RBC # FLD: 14.1 % — SIGNIFICANT CHANGE UP (ref 10.3–14.5)
RH IG SCN BLD-IMP: POSITIVE — SIGNIFICANT CHANGE UP
SODIUM SERPL-SCNC: 137 MMOL/L — SIGNIFICANT CHANGE UP (ref 135–145)
SODIUM SERPL-SCNC: 138 MMOL/L — SIGNIFICANT CHANGE UP (ref 135–145)
WBC # BLD: 10.55 K/UL — HIGH (ref 3.8–10.5)
WBC # BLD: 7.27 K/UL — SIGNIFICANT CHANGE UP (ref 3.8–10.5)
WBC # FLD AUTO: 10.55 K/UL — HIGH (ref 3.8–10.5)
WBC # FLD AUTO: 7.27 K/UL — SIGNIFICANT CHANGE UP (ref 3.8–10.5)

## 2022-07-08 PROCEDURE — 93462 L HRT CATH TRNSPTL PUNCTURE: CPT

## 2022-07-08 PROCEDURE — 93653 COMPRE EP EVAL TX SVT: CPT

## 2022-07-08 PROCEDURE — 99223 1ST HOSP IP/OBS HIGH 75: CPT | Mod: 25

## 2022-07-08 PROCEDURE — 93655 ICAR CATH ABLTJ DSCRT ARRHYT: CPT

## 2022-07-08 PROCEDURE — 86077 PHYS BLOOD BANK SERV XMATCH: CPT

## 2022-07-08 PROCEDURE — 71045 X-RAY EXAM CHEST 1 VIEW: CPT | Mod: 26

## 2022-07-08 RX ORDER — COLCHICINE 0.6 MG
0.6 TABLET ORAL DAILY
Refills: 0 | Status: DISCONTINUED | OUTPATIENT
Start: 2022-07-08 | End: 2022-07-08

## 2022-07-08 RX ORDER — CHLORHEXIDINE GLUCONATE 213 G/1000ML
1 SOLUTION TOPICAL
Refills: 0 | Status: DISCONTINUED | OUTPATIENT
Start: 2022-07-08 | End: 2022-07-08

## 2022-07-08 RX ORDER — CHLORHEXIDINE GLUCONATE 213 G/1000ML
1 SOLUTION TOPICAL
Refills: 0 | Status: DISCONTINUED | OUTPATIENT
Start: 2022-07-08 | End: 2022-07-09

## 2022-07-08 RX ORDER — HYDROCHLOROTHIAZIDE 25 MG
25 TABLET ORAL DAILY
Refills: 0 | Status: DISCONTINUED | OUTPATIENT
Start: 2022-07-08 | End: 2022-07-08

## 2022-07-08 RX ORDER — METOPROLOL TARTRATE 50 MG
50 TABLET ORAL
Refills: 0 | Status: DISCONTINUED | OUTPATIENT
Start: 2022-07-08 | End: 2022-07-08

## 2022-07-08 RX ORDER — COLCHICINE 0.6 MG
0.6 TABLET ORAL EVERY 12 HOURS
Refills: 0 | Status: DISCONTINUED | OUTPATIENT
Start: 2022-07-08 | End: 2022-07-09

## 2022-07-08 RX ORDER — FUROSEMIDE 40 MG
40 TABLET ORAL ONCE
Refills: 0 | Status: DISCONTINUED | OUTPATIENT
Start: 2022-07-08 | End: 2022-07-08

## 2022-07-08 RX ORDER — MAGNESIUM SULFATE 500 MG/ML
2 VIAL (ML) INJECTION EVERY 4 HOURS
Refills: 0 | Status: COMPLETED | OUTPATIENT
Start: 2022-07-08 | End: 2022-07-09

## 2022-07-08 RX ORDER — LOSARTAN POTASSIUM 100 MG/1
100 TABLET, FILM COATED ORAL DAILY
Refills: 0 | Status: DISCONTINUED | OUTPATIENT
Start: 2022-07-08 | End: 2022-07-08

## 2022-07-08 RX ORDER — PANTOPRAZOLE SODIUM 20 MG/1
40 TABLET, DELAYED RELEASE ORAL DAILY
Refills: 0 | Status: DISCONTINUED | OUTPATIENT
Start: 2022-07-09 | End: 2022-07-09

## 2022-07-08 RX ORDER — NOREPINEPHRINE BITARTRATE/D5W 8 MG/250ML
0.05 PLASTIC BAG, INJECTION (ML) INTRAVENOUS
Qty: 8 | Refills: 0 | Status: DISCONTINUED | OUTPATIENT
Start: 2022-07-08 | End: 2022-07-08

## 2022-07-08 RX ADMIN — Medication 25 GRAM(S): at 21:34

## 2022-07-08 NOTE — PROGRESS NOTE ADULT - SUBJECTIVE AND OBJECTIVE BOX
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 EKG in the office showed AF/atypical aflutter VR ~110 bpm. She is on Coumadin with therapeutic INR levels (typically 2.5-3, but recently past 3 weeks INR was up in the 3.5 level per pt). Pre-procedure INR 5.0. Last dose of Warfarin was 7/7 evening.     SUBJECTIVE: Patient seen and examined at bedside. Reports she tolerated procedure well. Reports______. Denies CP, palpitations, SOB,     ROS:  CV: Denies chest pain  Resp: Denies SOB  GI: Denies abdominal pain, constipation, diarrhea, nausea, vomiting  : Denies dysuria  ID: Denies fevers, chills  MSK: Denies joint pain     OBJECTIVE:    VITAL SIGNS:  ICU Vital Signs Last 24 Hrs  T(C): 36.3 (08 Jul 2022 19:55), Max: 36.3 (08 Jul 2022 19:55)  T(F): 97.4 (08 Jul 2022 19:55), Max: 97.4 (08 Jul 2022 19:55)  HR: 77 (08 Jul 2022 19:56) (71 - 120)  BP: 97/62 (08 Jul 2022 19:56) (97/62 - 132/86)  BP(mean): 75 (08 Jul 2022 19:56) (75 - 101)  ABP: --  ABP(mean): --  RR: 17 (08 Jul 2022 19:56) (16 - 17)  SpO2: 100% (08 Jul 2022 19:56) (98% - 100%)    O2 Parameters below as of 08 Jul 2022 19:56  Patient On (Oxygen Delivery Method): ventilator              CAPILLARY BLOOD GLUCOSE          PHYSICAL EXAM:  General: NAD, comfortable  HEENT: NCAT, PERRL, clear conjunctiva, no scleral icterus  Neck: supple, no JVD  Respiratory: CTA b/l, no wheezing, rhonchi, rales  Cardiovascular: RRR, normal S1S2, no M/R/G  Vascular: 2+ radial and DP pulses  Abdomen: soft, NT/ND, bowel sounds in all four quadrants, no palpable masses  Extremities: WWP, no clubbing, cyanosis, or edema  Skin: No rashes present  Neuro:     MEDICATIONS:  MEDICATIONS  (STANDING):  colchicine 0.6 milliGRAM(s) Oral daily  losartan 100 milliGRAM(s) Oral daily  metoprolol succinate ER 50 milliGRAM(s) Oral two times a day  norepinephrine Infusion 0.05 MICROgram(s)/kG/Min (6.8 mL/Hr) IV Continuous <Continuous>    MEDICATIONS  (PRN):      ALLERGIES:  Allergies    No Known Allergies    Intolerances        LABS:                        12.2   7.27  )-----------( 266      ( 08 Jul 2022 12:02 )             37.8     07-08    138  |  102  |  38<H>  ----------------------------<  84  4.3   |  20<L>  |  1.66<H>    Ca    10.3      08 Jul 2022 12:02      PT/INR - ( 08 Jul 2022 12:02 )   PT: 61.1 sec;   INR: 5.05          PTT - ( 08 Jul 2022 12:02 )  PTT:58.3 sec      RADIOLOGY & ADDITIONAL TESTS: Reviewed.   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 EKG in outpt cardiologist office showed AF/atypical aflutter VR ~110 bpm. She is on Coumadin with therapeutic INR levels (typically 2.5-3, but recently past 3 weeks INR was up in the 3.5 level per pt). Pre-procedure INR 5.0. Last dose of Warfarin was 7/7 evening. Went in for ablation of afib/atypical aflutter. Tolerated procedure well.    SUBJECTIVE: Patient seen and examined at bedside. Reports she tolerated procedure well. Denies any current complaints. Denies CP, palpitations, SOB, abdominal pain, groin pain.     ROS:  CV: Denies chest pain, palpitations  Resp: Denies SOB, cough, sputum production  GI: Denies abdominal pain, constipation, diarrhea, nausea, vomiting  : Denies dysuria, hematuria  ID: Denies fevers, chills  MSK: Denies joint pain, myalgias  Neuro: Denies muscle weakness, h/a, seizures    OBJECTIVE:    VITAL SIGNS:  ICU Vital Signs Last 24 Hrs  T(C): 36.3 (08 Jul 2022 19:55), Max: 36.3 (08 Jul 2022 19:55)  T(F): 97.4 (08 Jul 2022 19:55), Max: 97.4 (08 Jul 2022 19:55)  HR: 77 (08 Jul 2022 19:56) (71 - 120)  BP: 97/62 (08 Jul 2022 19:56) (97/62 - 132/86)  BP(mean): 75 (08 Jul 2022 19:56) (75 - 101)  ABP: --  ABP(mean): --  RR: 17 (08 Jul 2022 19:56) (16 - 17)  SpO2: 100% (08 Jul 2022 19:56) (98% - 100%)    O2 Parameters below as of 08 Jul 2022 19:56  Patient On (Oxygen Delivery Method): ventilator              CAPILLARY BLOOD GLUCOSE          PHYSICAL EXAM:  General: NAD, elderly female resting comfortably in bed  Neck: supple, no JVD, no lymphadenoapthy  Respiratory: CTA b/l, no wheezing, rhonchi, rales  Cardiovascular: Mechanical heart sounds, RRR, no M/R/G  Vascular: 2+ radial and DP pulses  Abdomen: soft, NT/ND, bowel sounds in all four quadrants, no palpable masses  Extremities: WWP, no clubbing, cyanosis,  edema  Skin: Mild LE B/L mottling       MEDICATIONS:  MEDICATIONS  (STANDING):  colchicine 0.6 milliGRAM(s) Oral daily  losartan 100 milliGRAM(s) Oral daily  metoprolol succinate ER 50 milliGRAM(s) Oral two times a day  norepinephrine Infusion 0.05 MICROgram(s)/kG/Min (6.8 mL/Hr) IV Continuous <Continuous>    MEDICATIONS  (PRN):      ALLERGIES:  Allergies    No Known Allergies    Intolerances        LABS:                        12.2   7.27  )-----------( 266      ( 08 Jul 2022 12:02 )             37.8     07-08    138  |  102  |  38<H>  ----------------------------<  84  4.3   |  20<L>  |  1.66<H>    Ca    10.3      08 Jul 2022 12:02      PT/INR - ( 08 Jul 2022 12:02 )   PT: 61.1 sec;   INR: 5.05          PTT - ( 08 Jul 2022 12:02 )  PTT:58.3 sec      RADIOLOGY & ADDITIONAL TESTS: Reviewed.

## 2022-07-08 NOTE — CHART NOTE - NSCHARTNOTEFT_GEN_A_CORE
EPS BRIEF OP NOTE    SYLWIA WISE  [id number]    PROCEDURE:  - Atypical flutter ablation    INDICATION:  - Persistent flutter & fib    ELECTROPHYSIOLOGIST(S):  - Dr. Teresa (Attending)  - Dr. Paul (Fellow)  - Dr. Davies (Fellow)    ANESTHESIOLOGY:  - General   - Local    FINDINGS:  - US guided access of bilateral groins  - 8Fr in RFV, 9Fr & 7Fr in LFV  - ICE catheter used; no effusion at start or end of case  - 2g Ancef abx prophylaxis  - Mapped with Pentaray catheter  - Crossed with BRK1 transeptal needle  - Patient with left sided atrial tach/atypical flutter  - successful ablation of posterior wall starting from roof extending to middle to floor  - durable block confirmed in preexisting CTI and all four pulmonary veins  - Vascade MVP x3 used for hemostasis    COMPLICATIONS:  - none    RECOMMENDATIONS:  - 40mg IV lasix x1  - Hold coumadin tonight  - check INR in AM  - start PPI   - Colchicine 0.6 mg bid  - 4 hours bedrest  - monitor groins    --  Paulino Davies MD  Electrophysiology PGY7

## 2022-07-08 NOTE — H&P ADULT - ASSESSMENT
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 She has had few cardioversion in the past, pre and post AFIB PVI ablation in 11/2020.  She has been back in AFIB/Flutter since ~3/2022 and Sotalol was stopped as she was having breakthrough episodes.  Her Echo in 6/2021 showed severely dilated LA and normal LVEF function.  She is here today for redo afib/flutter ablation.   - Awaiting for ablation today.   - Her INR is 5.05 today.  Will hold Warfarin tonight.  Check INR tomorrow and to dose accordingly.   - Continue Metoprolol / hctz / losartan.   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 She has had few cardioversion in the past, prior to and post AFIB PVI ablation in 11/2020.  She has been back in AFIB/Flutter since ~3/2022 and Sotalol was stopped as she was having breakthrough episodes.  Her Echo in 6/2021 showed severely dilated LA and normal LVEF function.  She is here today for redo afib/flutter ablation.   - Awaiting for ablation today.   - Her INR is 5.05 today.  Will hold Warfarin tonight.  Check INR tomorrow and to dose accordingly.   - Continue Metoprolol / hctz / losartan.   - Bedrest post procedure per protocol

## 2022-07-08 NOTE — H&P ADULT - HISTORY OF PRESENT ILLNESS
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  Here for AFIB redo ablation.   She was first diagnosed with AFL with RVR back in 9/2018 and had a cardioversion.  Echo showed EF of 50-55% and moderate pHTN.  She had recurrent palpitations, noted to be in atrial fibrillation in 2020 and underwent Dccv in two separate occasions.  She also underwent PVI and right sided aflutter ablation in 11/2020.  In 2021, she had recurrent AFIB in late September and had another DCCV in 10/2021.    She was on Sotalol up until March 2022 when she was again back in AFIB.  EKG in the office showed AF/atypical aflutter VR ~110 bpm. Metoprolol was increased to 50 mg BID for rate control. She is on Coumadin with therapeutic INR levels (typically 2.5-3, but recently past 3 weeks INR was up in the 3.5 level per pt).    She has fatigue and some LOAIZA. Otherwise, not aware of palpitations. EKG today showed AT/AFL with wenckeback at 123 bpm.  Narrow QRS.   INR today 5.  Last dose of Warfarin was 7/7 evening.   She is here for EPS and ablation of AFIB/atypical aflutter.     ECHO 6/30/21: LVEF 55%. Metallic MV.  Moderate to severe TR.  Biatrial enlargement. LA size 62 cc/m2.

## 2022-07-08 NOTE — PATIENT PROFILE ADULT - FALL HARM RISK - HARM RISK INTERVENTIONS

## 2022-07-08 NOTE — PATIENT PROFILE ADULT - FUNCTIONAL ASSESSMENT - BASIC MOBILITY 4.
Spoke with Mom. Reports she has talked with VINITA about pt's daily migraines, going on every day x 15 days now. Started keeping a HA log and the HA usually starts between 1230 and 1pm. Today pt took adult dose Aleve at 1130 but did not prevent today's migraine from happening. Pt drinking plenty of fluids. Mom wondering if VINITA has any suggestions before the weekend or should just wait until Monday's appt.    3 = A little assistance

## 2022-07-08 NOTE — PROGRESS NOTE ADULT - ASSESSMENT
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 She has had few cardioversion in the past, prior to and post AFIB PVI ablation in 11/2020.  She has been back in AFIB/Flutter since ~3/2022 and Sotalol was stopped as she was having breakthrough episodes.  Her Echo in 6/2021 showed severely dilated LA and normal LVEF function. Now s/p ablation, hemodynamically stable.      NEURO:  No active issues    RESPIRATORY:  No active issues, monitor respiratory status closely s/p 4.5L total fluid during procedure    CARDIO:    #Afib/ atypical flutter s/p ablation  - Tolerated procedure well, no active hematoma currently, AxOx3, groin checks q1h  - INR 5.05 prior to procedure, hold coumadin today, re-check INR w AM labs  - EKG NSR, order partial and full TTE, CXR, f/u   - Continue colchicine, Monitor rhythm strip      #HTN  - Pt w episode of hypotension during procedure, SBP in 90s, received low dose pressors during procedure  - currently 104/54, hemodynamically stable, levophed standing order in the case pt becomes hypotensive  - Hold Home meds HCTZ, losartan, metoprolol, re-initiate if pressures rise    #Rheumatic Mitral Valve Disease s/p Mechanical Valve  - INR pre procedure 5.05, hold coumadin  - hold coumadin tonight, re-check INR w AM labs and resume w therapeutic INR      RENAL:  #AFSANEH: unclear baseline  - BUN/Cr 30/1.4, improved from earlier AM labs 38/1.66, spike likely due to pre-renal etiology reduced perfusion, fluids given  - Continue to follow, monitor UOP    GI:  - No active issues  - Protonix 40mg qd prophylaxis    ID:  - No active issues, afebrile  - Leukocytosis (10.5) likely due to recent procedure    ENDO:  - No active issues    HEME:  - H/H 9.3/29.0 from 12.2/37.8, likely dilutional component given 4.5 L total fluid given during procedure  - Follow AM CBC    DVT prophylaxis:    F: N/A  E: Replete to Mg>2, K >4  N: DASH diet    FULL CODE

## 2022-07-09 ENCOUNTER — TRANSCRIPTION ENCOUNTER (OUTPATIENT)
Age: 72
End: 2022-07-09

## 2022-07-09 VITALS
OXYGEN SATURATION: 100 % | HEART RATE: 67 BPM | DIASTOLIC BLOOD PRESSURE: 57 MMHG | RESPIRATION RATE: 21 BRPM | SYSTOLIC BLOOD PRESSURE: 115 MMHG

## 2022-07-09 LAB
ALBUMIN SERPL ELPH-MCNC: 3.7 G/DL — SIGNIFICANT CHANGE UP (ref 3.3–5)
ALP SERPL-CCNC: 75 U/L — SIGNIFICANT CHANGE UP (ref 40–120)
ALT FLD-CCNC: 12 U/L — SIGNIFICANT CHANGE UP (ref 10–45)
ANION GAP SERPL CALC-SCNC: 13 MMOL/L — SIGNIFICANT CHANGE UP (ref 5–17)
ANION GAP SERPL CALC-SCNC: 14 MMOL/L — SIGNIFICANT CHANGE UP (ref 5–17)
APTT BLD: 60.3 SEC — HIGH (ref 27.5–35.5)
AST SERPL-CCNC: 22 U/L — SIGNIFICANT CHANGE UP (ref 10–40)
BILIRUB SERPL-MCNC: 0.4 MG/DL — SIGNIFICANT CHANGE UP (ref 0.2–1.2)
BUN SERPL-MCNC: 32 MG/DL — HIGH (ref 7–23)
BUN SERPL-MCNC: 33 MG/DL — HIGH (ref 7–23)
CALCIUM SERPL-MCNC: 8.8 MG/DL — SIGNIFICANT CHANGE UP (ref 8.4–10.5)
CALCIUM SERPL-MCNC: 9.3 MG/DL — SIGNIFICANT CHANGE UP (ref 8.4–10.5)
CHLORIDE SERPL-SCNC: 106 MMOL/L — SIGNIFICANT CHANGE UP (ref 96–108)
CHLORIDE SERPL-SCNC: 106 MMOL/L — SIGNIFICANT CHANGE UP (ref 96–108)
CO2 SERPL-SCNC: 17 MMOL/L — LOW (ref 22–31)
CO2 SERPL-SCNC: 18 MMOL/L — LOW (ref 22–31)
CREAT SERPL-MCNC: 1.48 MG/DL — HIGH (ref 0.5–1.3)
CREAT SERPL-MCNC: 1.54 MG/DL — HIGH (ref 0.5–1.3)
EGFR: 36 ML/MIN/1.73M2 — LOW
EGFR: 38 ML/MIN/1.73M2 — LOW
GLUCOSE SERPL-MCNC: 162 MG/DL — HIGH (ref 70–99)
GLUCOSE SERPL-MCNC: 174 MG/DL — HIGH (ref 70–99)
HAPTOGLOB SERPL-MCNC: <10 MG/DL — LOW (ref 34–200)
HCT VFR BLD CALC: 31.2 % — LOW (ref 34.5–45)
HCT VFR BLD CALC: 31.3 % — LOW (ref 34.5–45)
HCV AB S/CO SERPL IA: 0.05 S/CO — SIGNIFICANT CHANGE UP
HCV AB SERPL-IMP: SIGNIFICANT CHANGE UP
HGB BLD-MCNC: 10.2 G/DL — LOW (ref 11.5–15.5)
HGB BLD-MCNC: 10.3 G/DL — LOW (ref 11.5–15.5)
INR BLD: 8.01 — CRITICAL HIGH (ref 0.88–1.16)
LACTATE SERPL-SCNC: 1 MMOL/L — SIGNIFICANT CHANGE UP (ref 0.5–2)
LDH SERPL L TO P-CCNC: 340 U/L — HIGH (ref 50–242)
MAGNESIUM SERPL-MCNC: 2.9 MG/DL — HIGH (ref 1.6–2.6)
MCHC RBC-ENTMCNC: 28.7 PG — SIGNIFICANT CHANGE UP (ref 27–34)
MCHC RBC-ENTMCNC: 29 PG — SIGNIFICANT CHANGE UP (ref 27–34)
MCHC RBC-ENTMCNC: 32.7 GM/DL — SIGNIFICANT CHANGE UP (ref 32–36)
MCHC RBC-ENTMCNC: 32.9 GM/DL — SIGNIFICANT CHANGE UP (ref 32–36)
MCV RBC AUTO: 87.9 FL — SIGNIFICANT CHANGE UP (ref 80–100)
MCV RBC AUTO: 88.2 FL — SIGNIFICANT CHANGE UP (ref 80–100)
NRBC # BLD: 0 /100 WBCS — SIGNIFICANT CHANGE UP (ref 0–0)
NRBC # BLD: 0 /100 WBCS — SIGNIFICANT CHANGE UP (ref 0–0)
PLATELET # BLD AUTO: 201 K/UL — SIGNIFICANT CHANGE UP (ref 150–400)
PLATELET # BLD AUTO: 216 K/UL — SIGNIFICANT CHANGE UP (ref 150–400)
POTASSIUM SERPL-MCNC: 4.2 MMOL/L — SIGNIFICANT CHANGE UP (ref 3.5–5.3)
POTASSIUM SERPL-MCNC: 4.3 MMOL/L — SIGNIFICANT CHANGE UP (ref 3.5–5.3)
POTASSIUM SERPL-SCNC: 4.2 MMOL/L — SIGNIFICANT CHANGE UP (ref 3.5–5.3)
POTASSIUM SERPL-SCNC: 4.3 MMOL/L — SIGNIFICANT CHANGE UP (ref 3.5–5.3)
PROT SERPL-MCNC: 6.2 G/DL — SIGNIFICANT CHANGE UP (ref 6–8.3)
PROTHROM AB SERPL-ACNC: 97.4 SEC — HIGH (ref 10.5–13.4)
RBC # BLD: 3.55 M/UL — LOW (ref 3.8–5.2)
RBC # FLD: 13.9 % — SIGNIFICANT CHANGE UP (ref 10.3–14.5)
RBC # FLD: 13.9 % — SIGNIFICANT CHANGE UP (ref 10.3–14.5)
RETICS #: 53.3 K/UL — SIGNIFICANT CHANGE UP (ref 25–125)
RETICS/RBC NFR: 1.5 % — SIGNIFICANT CHANGE UP (ref 0.5–2.5)
SODIUM SERPL-SCNC: 137 MMOL/L — SIGNIFICANT CHANGE UP (ref 135–145)
SODIUM SERPL-SCNC: 137 MMOL/L — SIGNIFICANT CHANGE UP (ref 135–145)
WBC # BLD: 9.25 K/UL — SIGNIFICANT CHANGE UP (ref 3.8–10.5)
WBC # BLD: 9.61 K/UL — SIGNIFICANT CHANGE UP (ref 3.8–10.5)
WBC # FLD AUTO: 9.25 K/UL — SIGNIFICANT CHANGE UP (ref 3.8–10.5)
WBC # FLD AUTO: 9.61 K/UL — SIGNIFICANT CHANGE UP (ref 3.8–10.5)

## 2022-07-09 PROCEDURE — 83615 LACTATE (LD) (LDH) ENZYME: CPT

## 2022-07-09 PROCEDURE — 36415 COLL VENOUS BLD VENIPUNCTURE: CPT

## 2022-07-09 PROCEDURE — 82803 BLOOD GASES ANY COMBINATION: CPT

## 2022-07-09 PROCEDURE — C1730: CPT

## 2022-07-09 PROCEDURE — 71045 X-RAY EXAM CHEST 1 VIEW: CPT | Mod: 26

## 2022-07-09 PROCEDURE — 86850 RBC ANTIBODY SCREEN: CPT

## 2022-07-09 PROCEDURE — 86880 COOMBS TEST DIRECT: CPT

## 2022-07-09 PROCEDURE — 80053 COMPREHEN METABOLIC PANEL: CPT

## 2022-07-09 PROCEDURE — 83605 ASSAY OF LACTIC ACID: CPT

## 2022-07-09 PROCEDURE — 84132 ASSAY OF SERUM POTASSIUM: CPT

## 2022-07-09 PROCEDURE — 71045 X-RAY EXAM CHEST 1 VIEW: CPT

## 2022-07-09 PROCEDURE — 85027 COMPLETE CBC AUTOMATED: CPT

## 2022-07-09 PROCEDURE — 85045 AUTOMATED RETICULOCYTE COUNT: CPT

## 2022-07-09 PROCEDURE — 86900 BLOOD TYPING SEROLOGIC ABO: CPT

## 2022-07-09 PROCEDURE — 84100 ASSAY OF PHOSPHORUS: CPT

## 2022-07-09 PROCEDURE — 82565 ASSAY OF CREATININE: CPT

## 2022-07-09 PROCEDURE — 93308 TTE F-UP OR LMTD: CPT | Mod: 26

## 2022-07-09 PROCEDURE — C1760: CPT

## 2022-07-09 PROCEDURE — 85730 THROMBOPLASTIN TIME PARTIAL: CPT

## 2022-07-09 PROCEDURE — 82947 ASSAY GLUCOSE BLOOD QUANT: CPT

## 2022-07-09 PROCEDURE — 85347 COAGULATION TIME ACTIVATED: CPT

## 2022-07-09 PROCEDURE — 86901 BLOOD TYPING SEROLOGIC RH(D): CPT

## 2022-07-09 PROCEDURE — 99239 HOSP IP/OBS DSCHRG MGMT >30: CPT

## 2022-07-09 PROCEDURE — C1732: CPT

## 2022-07-09 PROCEDURE — 85014 HEMATOCRIT: CPT

## 2022-07-09 PROCEDURE — 86803 HEPATITIS C AB TEST: CPT

## 2022-07-09 PROCEDURE — 85610 PROTHROMBIN TIME: CPT

## 2022-07-09 PROCEDURE — 86870 RBC ANTIBODY IDENTIFICATION: CPT

## 2022-07-09 PROCEDURE — 82330 ASSAY OF CALCIUM: CPT

## 2022-07-09 PROCEDURE — C1766: CPT

## 2022-07-09 PROCEDURE — C1894: CPT

## 2022-07-09 PROCEDURE — 84295 ASSAY OF SERUM SODIUM: CPT

## 2022-07-09 PROCEDURE — 80048 BASIC METABOLIC PNL TOTAL CA: CPT

## 2022-07-09 PROCEDURE — C2630: CPT

## 2022-07-09 PROCEDURE — 93321 DOPPLER ECHO F-UP/LMTD STD: CPT

## 2022-07-09 PROCEDURE — C1759: CPT

## 2022-07-09 PROCEDURE — 83735 ASSAY OF MAGNESIUM: CPT

## 2022-07-09 PROCEDURE — 83010 ASSAY OF HAPTOGLOBIN QUANT: CPT

## 2022-07-09 RX ORDER — COLCHICINE 0.6 MG
1 TABLET ORAL
Qty: 60 | Refills: 0
Start: 2022-07-09 | End: 2022-08-07

## 2022-07-09 RX ORDER — PANTOPRAZOLE SODIUM 20 MG/1
1 TABLET, DELAYED RELEASE ORAL
Qty: 30 | Refills: 0
Start: 2022-07-09 | End: 2022-08-07

## 2022-07-09 RX ORDER — LOSARTAN POTASSIUM 100 MG/1
100 TABLET, FILM COATED ORAL
Qty: 0 | Refills: 0 | DISCHARGE

## 2022-07-09 RX ORDER — WARFARIN SODIUM 2.5 MG/1
1 TABLET ORAL
Qty: 30 | Refills: 0
Start: 2022-07-09 | End: 2022-08-07

## 2022-07-09 RX ORDER — METOPROLOL TARTRATE 50 MG
1 TABLET ORAL
Qty: 30 | Refills: 0
Start: 2022-07-09 | End: 2022-08-07

## 2022-07-09 RX ORDER — CHLORHEXIDINE GLUCONATE 213 G/1000ML
1 SOLUTION TOPICAL
Refills: 0 | Status: DISCONTINUED | OUTPATIENT
Start: 2022-07-09 | End: 2022-07-09

## 2022-07-09 RX ORDER — METOPROLOL TARTRATE 50 MG
2 TABLET ORAL
Qty: 0 | Refills: 0 | DISCHARGE

## 2022-07-09 RX ADMIN — Medication 0.6 MILLIGRAM(S): at 11:43

## 2022-07-09 RX ADMIN — Medication 0.6 MILLIGRAM(S): at 01:06

## 2022-07-09 RX ADMIN — CHLORHEXIDINE GLUCONATE 1 APPLICATION(S): 213 SOLUTION TOPICAL at 06:20

## 2022-07-09 RX ADMIN — PANTOPRAZOLE SODIUM 40 MILLIGRAM(S): 20 TABLET, DELAYED RELEASE ORAL at 11:42

## 2022-07-09 RX ADMIN — Medication 25 GRAM(S): at 01:03

## 2022-07-09 NOTE — PROGRESS NOTE ADULT - SUBJECTIVE AND OBJECTIVE BOX
INTERVAL HPI/OVERNIGHT EVENTS:    SUBJECTIVE: Patient seen and examined at bedside.    OBJECTIVE:    VITAL SIGNS:  ICU Vital Signs Last 24 Hrs  T(C): 36.1 (09 Jul 2022 04:08), Max: 36.3 (08 Jul 2022 19:55)  T(F): 97 (09 Jul 2022 04:08), Max: 97.4 (08 Jul 2022 19:55)  HR: 71 (09 Jul 2022 08:00) (64 - 120)  BP: 97/52 (08 Jul 2022 21:30) (90/53 - 132/86)  BP(mean): 70 (08 Jul 2022 21:30) (65 - 101)  ABP: 121/62 (09 Jul 2022 08:00) (92/45 - 122/63)  ABP(mean): 87 (09 Jul 2022 08:00) (64 - 88)  RR: 30 (09 Jul 2022 08:00) (13 - 33)  SpO2: 100% (09 Jul 2022 08:00) (96% - 100%)    O2 Parameters below as of 09 Jul 2022 08:00  Patient On (Oxygen Delivery Method): room air              07-08 @ 07:01  -  07-09 @ 07:00  --------------------------------------------------------  IN: 400 mL / OUT: 701 mL / NET: -301 mL      CAPILLARY BLOOD GLUCOSE          PHYSICAL EXAM:    General: NAD  HEENT: NC/AT; anicteric sclera  Neck: supple, no JVD  Respiratory: CTA B/L; no W/R/R  Cardiovascular: +S1/S2; RRR; no M/R/G  Gastrointestinal: soft, NT/ND; +BS x4  Extremities: WWP; 2+ peripheral pulses B/L; no LE edema  Skin: normal color and turgor; no rash  Neurological:     MEDICATIONS:  MEDICATIONS  (STANDING):  chlorhexidine 2% Cloths 1 Application(s) Topical two times a day  chlorhexidine 4% Liquid 1 Application(s) Topical <User Schedule>  colchicine 0.6 milliGRAM(s) Oral every 12 hours  pantoprazole    Tablet 40 milliGRAM(s) Oral daily    MEDICATIONS  (PRN):      ALLERGIES:  Allergies    No Known Allergies    Intolerances        LABS:                        10.2   9.25  )-----------( 216      ( 09 Jul 2022 06:05 )             31.2     07-09    137  |  106  |  32<H>  ----------------------------<  162<H>  4.2   |  18<L>  |  1.48<H>    Ca    9.3      09 Jul 2022 06:05  Phos  3.4     07-08  Mg     2.9     07-09    TPro  6.2  /  Alb  3.7  /  TBili  0.4  /  DBili  x   /  AST  22  /  ALT  12  /  AlkPhos  75  07-09    LIVER FUNCTIONS - ( 09 Jul 2022 00:21 )  Alb: 3.7 g/dL / Pro: 6.2 g/dL / ALK PHOS: 75 U/L / ALT: 12 U/L / AST: 22 U/L / GGT: x           PT/INR - ( 09 Jul 2022 06:05 )   PT: 97.4 sec;   INR: 8.01          PTT - ( 09 Jul 2022 06:05 )  PTT:60.3 sec          RADIOLOGY & ADDITIONAL TESTS: Reviewed.    ASSESSMENT:    PLAN:     CARDIOVASCULAR  #Pump -     #Vessels -     #Electrical -     #Valves -     PULMONARY    RENAL    NEURO    GI/FEN - no IVF; replete lytes prn; NPO    PPx -     LINES -     CODE - FULL.    DISPO - continue intensive level of care on [facility] CCU.

## 2022-07-09 NOTE — DISCHARGE NOTE PROVIDER - NSDCMRMEDTOKEN_GEN_ALL_CORE_FT
Coumadin 5 mg oral tablet: 1 tab(s) orally once a day   hydroCHLOROthiazide 25 mg oral tablet: 1 tab(s) orally once a day  losartan: 100 milligram(s) orally once a day  metoprolol succinate 25 mg oral tablet, extended release: 2 tab(s) orally 2 times a day   colchicine 0.6 mg oral tablet: 1 tab(s) orally every 12 hours  pantoprazole 40 mg oral delayed release tablet: 1 tab(s) orally once a day  Toprol-XL 25 mg oral tablet, extended release: 1 tab(s) orally once a day   warfarin 5 mg oral tablet: 1 tab(s) orally once a day

## 2022-07-09 NOTE — DISCHARGE NOTE NURSING/CASE MANAGEMENT/SOCIAL WORK - NSDCPEFALRISK_GEN_ALL_CORE
For information on Fall & Injury Prevention, visit: [email or web address] OR  [email or web address] OR  https://www.cdc.gov/steadi/patient.html

## 2022-07-09 NOTE — DISCHARGE NOTE PROVIDER - HOSPITAL COURSE
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 She has had multiple cardioversions in the past, prior to and post AFIB PVI ablation in 11/2020. She has been back in AFIB/Flutter since ~3/2022. Now patient is s/p ablation and hemodynamically stable.    Hospital course (by problem):     # Afib/ atypical flutter s/p ablation  - Tolerated procedure well, no active hematoma at access sites, AxOx3  - INR pre procedure 5.05, INR post-procedure 8.01  - hold coumadin until INR can be checked outpatient Monday 7/11  - EKG post-procedure NSR  - Echo post-procedure   - Start colchicine 0.6mg BID x 30 days for post procedural prophylaxis   - Start protonix 40mg QD x 30 days    # Hypertension  - Pt w episode of hypotension during procedure, SBP in 90s, received low dose pressors during procedure  - currently 104/54, hemodynamically stable, levophed standing order in the case pt becomes hypotensive  - Hold Home meds HCTZ, losartan, metoprolol, re-initiate if pressures rise    # Rheumatic Mitral Valve Disease s/p Mechanical Valve  - INR pre procedure 5.05, INR post-procedure 8.01  - hold coumadin until INR can be checked outpatient Monday 7/11  - f/u with PCP for instructions on when to resume    RENAL  # AFSANEH: unclear baseline  - BUN/Cr 30/1.4, improved from earlier AM labs 38/1.66, spike likely due to pre-renal etiology reduced perfusion, fluids given    HEME/ONC  # Anemia  - H/H 9.3/29.0 from 12.2/37.8, likely dilutional component given 4.5 L total fluid given during procedure    # Leukocytosis  - Afebrile  - Leukocytosis (10.5) likely due to recent procedure      Patient was discharged to: home    New medications: colchicine 0.6mg BID, protonix 40mg qd  Changes to old medications: toprol 25mg qd; coumadin 5mg held until 7/11 INR check and PCP f/u; losartan and HCTZ held until PCP f/u  Medications that were stopped: none    Items to follow up as outpatient: INR check on 7/11 and f/u with PCP to restart coumadin, losartan, and HCTZ as appropriate. F/U with EP outpatient.    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 She has had multiple cardioversions in the past, prior to and post AFIB PVI ablation in 11/2020. She has been back in AFIB/Flutter since ~3/2022. Now patient is s/p ablation to posterior wall starting from roof extending to middle to floor and hemodynamically stable for discharge.    Hospital course (by problem):     # Afib/ atypical flutter s/p ablation  - Tolerated procedure well, no active hematoma at access sites, AxOx3  - INR pre procedure 5.05, INR post-procedure 8.01  - hold coumadin until INR can be checked outpatient Monday 7/11  - EKG post-procedure NSR  - Echo post-procedure   - Start colchicine 0.6mg BID x 30 days for post procedural prophylaxis   - Start protonix 40mg QD x 30 days    # Hypertension  - Pt w episode of hypotension during procedure, SBP in 90s, received IVF.   - currently 104/54, hemodynamically stable  - Hold Home meds HCTZ, losartan, resume as appropriate  - decrease toprol dose to 25mg daily post procedure metoprolol    # Rheumatic Mitral Valve Disease s/p Mechanical Valve  - INR pre procedure 5.05, INR post-procedure 8.01  - hold coumadin until INR can be checked outpatient Monday 7/11  - f/u with PCP for instructions on when to resume    RENAL  # AFSANEH: unclear baseline  - BUN/Cr 30/1.4, improved from earlier AM labs 38/1.66, spike likely due to pre-renal etiology reduced perfusion, fluids given    HEME/ONC  # Anemia  - H/H 9.3/29.0 from 12.2/37.8, likely dilutional component given 4.5 L total fluid given during procedure    # Leukocytosis  - Afebrile  - Leukocytosis (10.5) likely due to recent procedure    Patient was discharged to: home    New medications: colchicine 0.6mg BID, protonix 40mg qd  Changes to old medications: toprol 25mg qd; coumadin 5mg held until 7/11 INR check and PCP f/u; losartan and HCTZ held until PCP f/u  Medications that were stopped: none    Items to follow up as outpatient: INR check on 7/11 and f/u with PCP to restart coumadin, losartan, and HCTZ as appropriate. F/U with EP outpatient.    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 She has had multiple cardioversions in the past, prior to and post AFIB PVI ablation in 11/2020. She has been back in AFIB/Flutter since ~3/2022. Now patient is s/p ablation to posterior wall starting from roof extending to middle to floor and hemodynamically stable for discharge.    Hospital course (by problem):     # Afib/ atypical flutter s/p ablation  - Tolerated procedure well, no active hematoma at access sites, AxOx3  - INR pre procedure 5.05, INR post-procedure 8.01  - hold coumadin until INR can be checked outpatient Monday 7/11  - EKG post-procedure NSR  - Start colchicine 0.6mg BID x 30 days for post procedural prophylaxis   - Start protonix 40mg QD x 30 days    # Hypertension  - Pt w episode of hypotension during procedure, SBP in 90s, received IVF.   - currently 104/54, hemodynamically stable  - Hold Home meds HCTZ, losartan, resume as appropriate  - decrease toprol dose to 25mg daily post procedure metoprolol    # Rheumatic Mitral Valve Disease s/p Mechanical Valve  - INR pre procedure 5.05, INR post-procedure 8.01  - hold coumadin until INR can be checked outpatient Monday 7/11  - f/u with PCP for instructions on when to resume    RENAL  # AFSANEH: unclear baseline  - BUN/Cr 30/1.4, improved from earlier AM labs 38/1.66, spike likely due to pre-renal etiology reduced perfusion, fluids given    HEME/ONC  # Anemia  - H/H 9.3/29.0 from 12.2/37.8, likely dilutional component given 4.5 L total fluid given during procedure    # Leukocytosis  - Afebrile  - Leukocytosis (10.5) likely due to recent procedure    Patient was discharged to: home    New medications: colchicine 0.6mg BID, protonix 40mg qd  Changes to old medications: toprol 25mg qd; coumadin 5mg held until 7/11 INR check and PCP f/u; losartan and HCTZ held until PCP f/u  Medications that were stopped: none    Items to follow up as outpatient: INR check on 7/11 and f/u with PCP to restart coumadin, losartan, and HCTZ as appropriate. F/U with EP outpatient.    Physical exam at the time of discharge:    General: NAD, resting comfortably in bed  Neck: supple, no JVD, no lymphadenopathy  Respiratory: CTA B/L, no wheezing, rhonchi, rales  Cardiovascular: RRR, no M/R/G  Vascular: 2+ radial and DP pulses B/L  Abdomen: soft, NT/ND, bowel sounds in all four quadrants, no palpable masses  Extremities: WWP, no clubbing, cyanosis,  edema  Skin: Mild LE B/L mottling  Access: B/L groin with no evidence of hematoma or bleeding

## 2022-07-09 NOTE — DISCHARGE NOTE PROVIDER - PROVIDER TOKENS
PROVIDER:[TOKEN:[[id number]],ESTABLISHEDPATIENT:[T]],PROVIDER:[TOKEN:[[id number]],ESTABLISHEDPATIENT:[T]]

## 2022-07-09 NOTE — PROGRESS NOTE ADULT - ASSESSMENT
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 She has had few cardioversion in the past, prior to and post AFIB PVI ablation in 11/2020.  She has been back in AFIB/Flutter since ~3/2022 and Sotalol was stopped as she was having breakthrough episodes.  Her Echo in 6/2021 showed severely dilated LA and normal LVEF function. Now s/p ablation, hemodynamically stable.    PLAN    NEUROLOGY  No active issues    RESPIRATORY  No active issues, monitor respiratory status closely s/p 4.5L total fluid during procedure    CARDIOLOGY  #Afib/ atypical flutter s/p ablation  - Tolerated procedure well, no active hematoma currently, AxOx3, groin checks q1h  - INR 5.05 prior to procedure, hold coumadin today, re-check INR w AM labs  - EKG NSR, order partial and full TTE, CXR, f/u   - Continue colchicine, Monitor rhythm strip    #HTN  - Pt w episode of hypotension during procedure, SBP in 90s, received low dose pressors during procedure  - currently 104/54, hemodynamically stable, levophed standing order in the case pt becomes hypotensive  - Hold Home meds HCTZ, losartan, metoprolol, re-initiate if pressures rise    #Rheumatic Mitral Valve Disease s/p Mechanical Valve  - INR pre procedure 5.05, hold coumadin  - hold coumadin tonight, re-check INR w AM labs and resume w therapeutic INR    RENAL  #AFSANEH: unclear baseline  - BUN/Cr 30/1.4, improved from earlier AM labs 38/1.66, spike likely due to pre-renal etiology reduced perfusion, fluids given  - Continue to follow, monitor UOP    GASTROINTESTINAL  - No active issues  - Protonix 40mg qd prophylaxis    INFECTIOUS DISEASE  - No active issues, afebrile  - Leukocytosis (10.5) likely due to recent procedure    ENDOCRINE  - No active issues    HEME/ONC  - H/H 9.3/29.0 from 12.2/37.8, likely dilutional component given 4.5 L total fluid given during procedure  - Follow AM CBC    F: N/A  E: Replete to Mg>2, K >4  N: DASH diet  Code: FULL CODE

## 2022-07-09 NOTE — DISCHARGE NOTE PROVIDER - CARE PROVIDERS DIRECT ADDRESSES
[email or web address]

## 2022-07-09 NOTE — DISCHARGE NOTE PROVIDER - CARE PROVIDER_API CALL
Jason Teresa)  Cardiac Electrophysiology  [address], [address] [place] [address]  Phone: [phone]  Fax: [phone]  Established Patient  Follow Up Time:     Jose Michaels)  Cardiovascular Disease; Internal Medicine; Nuclear Cardiology  [address] [address]  Phone: [phone]  Fax: [phone]  Established Patient  Follow Up Time:

## 2022-07-09 NOTE — DISCHARGE NOTE NURSING/CASE MANAGEMENT/SOCIAL WORK - PATIENT PORTAL LINK FT
You can access the FollowMyHealth Patient Portal offered by [facility] by registering at the following website: [email or web address]. By joining [employer]s FollowMyHealth portal, you will also be able to view your health information using other applications (apps) compatible with our system.

## 2022-07-09 NOTE — DISCHARGE NOTE PROVIDER - NSDCCPCAREPLAN_GEN_ALL_CORE_FT
PRINCIPAL DISCHARGE DIAGNOSIS  Diagnosis: S/P ablation of atrial fibrillation  Assessment and Plan of Treatment: You have Atrial Fibrillation and underwent a procedure called ablation. Your INR was 8.01 on the day of discharge. This is high and means you need to STOP taking COUMADIN until you get your INR rechecked. Please check your INR on 7/11 and talk to your PCP about when to resume COUMADIN. Please follow up with your primary care doctor and Dr. Teresa within one week of discharge. Your METOPROLOL dose was reduced. Please take the following medications:  Toprol-XL 25mg by mouth once a day  Protonix 40mg by mouth once a day  Colchicine 0.6mg by mouth twice a day        SECONDARY DISCHARGE DIAGNOSES  Diagnosis: HTN (hypertension)  Assessment and Plan of Treatment: Hypertension is the medical term for high blood pressure. Blood pressure refers to the pressure that blood applies to the inner walls of the arteries. Arteries carry blood from the heart to other organs and parts of the body. Untreated high blood pressure increases the strain on the heart and arteries, eventually causing organ damage. High blood pressure increases the risk of heart failure, heart attack (myocardial infarction), stroke, and kidney failure. High blood pressure does not usually cause any symptoms. Treatment of hypertension usually begins with lifestyle changes. Making these lifestyle changes involves little or no risk. Recommended changes often include reducing the amount of salt in your diet, losing weight if you are overweight or obese, avoiding drinking too much alcohol, stopping smoking and exercising at least 30 minutes per day most days of the week.   Your medications HYDROCHLOROTHIAZIDE and LOSARTAN were stopped because your blood pressures were low. Please follow up with your PCP within one week of discharge to have your blood pressure checked and to see whether these medications can be resumed.

## 2022-07-18 DIAGNOSIS — I44.1 ATRIOVENTRICULAR BLOCK, SECOND DEGREE: ICD-10-CM

## 2022-07-18 DIAGNOSIS — I27.20 PULMONARY HYPERTENSION, UNSPECIFIED: ICD-10-CM

## 2022-07-18 DIAGNOSIS — Z95.2 PRESENCE OF PROSTHETIC HEART VALVE: ICD-10-CM

## 2022-07-18 DIAGNOSIS — I48.91 UNSPECIFIED ATRIAL FIBRILLATION: ICD-10-CM

## 2022-07-18 DIAGNOSIS — I48.4 ATYPICAL ATRIAL FLUTTER: ICD-10-CM

## 2022-07-18 DIAGNOSIS — D72.829 ELEVATED WHITE BLOOD CELL COUNT, UNSPECIFIED: ICD-10-CM

## 2022-07-18 DIAGNOSIS — K21.9 GASTRO-ESOPHAGEAL REFLUX DISEASE WITHOUT ESOPHAGITIS: ICD-10-CM

## 2022-07-18 DIAGNOSIS — I47.1 SUPRAVENTRICULAR TACHYCARDIA: ICD-10-CM

## 2022-07-18 DIAGNOSIS — N28.9 DISORDER OF KIDNEY AND URETER, UNSPECIFIED: ICD-10-CM

## 2022-07-18 DIAGNOSIS — Z79.01 LONG TERM (CURRENT) USE OF ANTICOAGULANTS: ICD-10-CM

## 2022-07-18 DIAGNOSIS — I95.9 HYPOTENSION, UNSPECIFIED: ICD-10-CM

## 2022-07-18 DIAGNOSIS — I10 ESSENTIAL (PRIMARY) HYPERTENSION: ICD-10-CM

## 2022-07-18 DIAGNOSIS — D64.9 ANEMIA, UNSPECIFIED: ICD-10-CM

## 2022-08-19 NOTE — PATIENT PROFILE ADULT. - BLOOD AVOIDANCE/RESTRICTIONS, PROFILE
Notification of Discharge   This is a Notification of Discharge from our facility [address]  Please be advised that this patient has been discharge from our facility  Below you will find the admission and discharge date and time including the patients disposition  UTILIZATION REVIEW CONTACT:  [address] [place]  Utilization   Network Utilization Review Department  Phone: [phone] x carefully listen to the prompts  All voicemails are confidential   Email: [email or web address]     PHYSICIAN ADVISORY SERVICES:  FOR [facility] REVIEW - MEDICAL NECESSITY DENIAL  Phone: [phone]  Fax: [phone]  Email: [email or web address]     PRESENTATION DATE: 8/15/2022  3:54 PM  OBERVATION ADMISSION DATE:   INPATIENT ADMISSION DATE: 8/15/22  6:40 PM   DISCHARGE DATE: 8/18/2022  4:36 PM  DISPOSITION: Home/Self Care Home/Self Care      IMPORTANT INFORMATION:  Send all requests for admission clinical reviews, approved or denied determinations and any other requests to dedicated fax number below belonging to the campus where the patient is receiving treatment   List of dedicated fax numbers:  [address] DENIALS (Administrative/Medical Necessity) [phone]   [address] (Maternity/NICU/Pediatrics) [phone]   Paul Challenger [phone]   130 [facility] [phone]   [address] [place] [address] [phone]   2000 [facility] [address] [place] [address],[address] [place] [address] [phone]   [facility]  [phone]   [address] [place] [address], [facility]  [address] [facility] 1000 [facility] [phone] none

## 2022-12-05 LAB
INR PPP: 3.94
PT BLD: 40.8

## 2023-05-18 ENCOUNTER — EMERGENCY (EMERGENCY)
Facility: HOSPITAL | Age: 73
LOS: 1 days | Discharge: HOME | End: 2023-05-18
Attending: STUDENT IN AN ORGANIZED HEALTH CARE EDUCATION/TRAINING PROGRAM
Payer: MEDICARE

## 2023-05-18 VITALS
HEART RATE: 73 BPM | RESPIRATION RATE: 18 BRPM | SYSTOLIC BLOOD PRESSURE: 162 MMHG | HEIGHT: 59.06 IN | DIASTOLIC BLOOD PRESSURE: 86 MMHG | TEMPERATURE: 98 F | WEIGHT: 154.32 LBS | OXYGEN SATURATION: 98 %

## 2023-05-18 DIAGNOSIS — Z95.2 PRESENCE OF PROSTHETIC HEART VALVE: Chronic | ICD-10-CM

## 2023-05-18 DIAGNOSIS — Z98.890 OTHER SPECIFIED POSTPROCEDURAL STATES: Chronic | ICD-10-CM

## 2023-05-18 PROCEDURE — 70450 CT HEAD/BRAIN W/O DYE: CPT | Mod: 26,MA

## 2023-05-18 PROCEDURE — 72125 CT NECK SPINE W/O DYE: CPT | Mod: 26,MA

## 2023-05-18 PROCEDURE — 99284 EMERGENCY DEPT VISIT MOD MDM: CPT | Mod: 25

## 2023-05-18 PROCEDURE — 72125 CT NECK SPINE W/O DYE: CPT | Mod: MA

## 2023-05-18 PROCEDURE — 99284 EMERGENCY DEPT VISIT MOD MDM: CPT

## 2023-05-18 PROCEDURE — 70450 CT HEAD/BRAIN W/O DYE: CPT | Mod: MA

## 2023-05-18 RX ORDER — ACETAMINOPHEN 500 MG
650 TABLET ORAL ONCE
Refills: 0 | Status: COMPLETED | OUTPATIENT
Start: 2023-05-18 | End: 2023-05-18

## 2023-05-18 RX ADMIN — Medication 650 MILLIGRAM(S): at 05:16

## 2023-05-18 RX ADMIN — Medication 650 MILLIGRAM(S): at 06:16

## 2023-05-18 NOTE — ED PROVIDER NOTE - PATIENT PORTAL LINK FT
You can access the FollowMyHealth Patient Portal offered by [facility] by registering at the following website: [email or web address]. By joining [employer]’s FollowMyHealth portal, you will also be able to view your health information using other applications (apps) compatible with our system.

## 2023-05-18 NOTE — ED PROVIDER NOTE - CLINICAL SUMMARY MEDICAL DECISION MAKING FREE TEXT BOX
Pt p/w 3 weeks of intermittent pain over back of head after minor head trauma. Reassuring exam. Given age and blood thinner will obtain CT. Pt stable. Will reassess. Pt p/w 3 weeks of intermittent pain over back of head after minor head trauma. Reassuring exam. Given age and blood thinner will obtain CT. Pt stable. Will reassess.    CT showing no acute path. On reassessment pt states that her pain has improved and feeling well. Rec PMD f/u ASAP. Most likely no serious internal injuries - the details of the case, history, and exam make more emergent diagnoses much less likely. Discussed with pt my clinical impression and results, patient given strict return precautions if persistent or worsening of symptoms occurs, and need for close follow up. Pt expressed understanding and agrees with plan. Pt is well appearing with a reassuring exam. Discharge home with PMD or Specialist f/u within 5 days.

## 2023-05-18 NOTE — ED ADULT NURSE NOTE - NSFALLUNIVINTERV_ED_ALL_ED
Bed/Stretcher in lowest position, wheels locked, appropriate side rails in place/Call bell, personal items and telephone in reach/Instruct patient to call for assistance before getting out of bed/chair/stretcher/Non-slip footwear applied when patient is off stretcher/[place] to call system/Physically safe environment - no spills, clutter or unnecessary equipment/Purposeful proactive rounding/Room/bathroom lighting operational, light cord in reach

## 2023-05-18 NOTE — ED PROVIDER NOTE - PHYSICAL EXAMINATION
Vital Signs Reviewed  GEN: Comfortable, NAD  HEENT: NCAT, No cspine TTP, MMM, Neck Supple  RESP: CTAB, No rales/rhonchi/wheezing  CV: RRR, S1S2, No murmurs  ABD: No TTP, ND, No masses  Extrem/Skin: Equal pulses bilat, No cyanosis/edema/rashes  Neuro: AAOx3, CNs Grossly Intact, Nml coordinating mvmts, Equal strength/sensation in all extremities bilat, No Pronator Drift, Gait at baseline with cane

## 2023-05-18 NOTE — ED PROVIDER NOTE - NSFOLLOWUPINSTRUCTIONS_ED_ALL_ED_FT
You were seen in the emergency room today. Please call your primary doctor to inform them of this ER visit and obtain the next available appointment within the next 5 days. As we discussed, return to the ER if you have any worsening symptoms.    We no longer feel that you need further emergency care or admission to the hospital at this time.    While we have determined that you are currently stable for discharge, we know that things can change. Please seek immediate medical attention or return to the ER if you experience any of the following:  Any worsening or persistent symptoms  Severe Pain  Chest Pain  Difficulty Breathing  Bleeding  Passing Out  Severe Rash  Inability to Eat or Drink  Persistent Fever    Please see a primary care doctor or specialist within 5 days to ensure that you are improving.    Please call the [facility] phone numbers on this document if you have any problems obtaining a follow up appointment.    I wish you well! -Dr Gee

## 2023-05-18 NOTE — ED ADULT NURSE NOTE - OBJECTIVE STATEMENT
Pt presents to ED AAOx4, from home c/o posterior head pain s/p hitting head on marble countertop 3 weeks ago. Pt denies pain radiation. Reports pain is intermittent in nature, concerned because the pain is still present after 3 weeks. Pt reports taking blood thinners for metal valve replaced x20 years ago & a-flutter. Denies LOC, headache, N/V/D, numbness or tingling in extremities. Pt presents to ED AAOx4, from home c/o posterior head pain s/p hitting head on marble countertop 3 weeks ago. Pt denies pain radiation. Reports pain is intermittent in nature, concerned because the pain is still present after 3 weeks. Pt reports taking blood thinners for mitral valve replaced x20 years ago & a-flutter. Denies LOC, headache, N/V/D, numbness or tingling in extremities.

## 2023-05-18 NOTE — ED ADULT TRIAGE NOTE - CHIEF COMPLAINT QUOTE
c/o pain in her head '' I hit my head under the kitchen counter 3 weeks ago and the I still have the pain '' as per pt stated denies loc

## 2023-05-18 NOTE — ED PROVIDER NOTE - OBJECTIVE STATEMENT
72-year-old female with history of atrial fibrillation and mitral valve replacement on Coumadin, hypertension presenting with intermittent headaches since minor head trauma 3 weeks ago.  Patient states that 3 weeks ago she was in her kitchen when she bent over and when lifting her head struck the back of her head against a marble table.  Patient states that she had no loss of consciousness vomiting or blurry vision however had pain over the back of her head which is intermittently continued over the last 3 weeks. Pt felt well before the event and denies LOC, vomiting, other pain/injuries, etoh/drug use, fever/infectious symptoms, neck stiffness, focal numbness/weakness, other recent illness or hospitalizations.

## 2023-08-14 NOTE — HISTORY OF PRESENT ILLNESS
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 Echo showed EF of 50-55% and moderate pHTN.  She had recurrent atrial fibrillation 5/2020 s/p cardioversion and again 8/2020 s/p cardioversion.  She has been resistant to an ablation.  Since her last cardioversion she is doing well.  She is anxious but no palpitations, SOB, syncope, near syncope or edema.  She is compliant with Eliquis.   She notes an improvement in the way she feels in NSR.  She never started Multaq as she is ready to proceed with an ablation. \par   Render Post-Care Instructions In Note?: no Detail Level: Simple Consent: The patient's consent was obtained including but not limited to risks of crusting, scabbing, blistering, scarring, darker or lighter pigmentary change, recurrence, incomplete removal and infection. Post-Care Instructions: I reviewed with the patient in detail post-care instructions. Patient is to wear sunprotection, and avoid picking at any of the treated lesions. Pt may apply Vaseline to crusted or scabbing areas. Number Of Freeze-Thaw Cycles: 2 freeze-thaw cycles Duration Of Freeze Thaw-Cycle (Seconds): 10 Application Tool (Optional): Liquid Nitrogen Sprayer Show Aperture Variable?: Yes

## 2023-11-30 NOTE — ED ADULT NURSE NOTE - PAIN RATING/NUMBER SCALE (0-10): ACTIVITY
RN left voicemail for patient to please call office back  According to appointment note, patient wanting to know what parts of flu shot she is allergic to, include skin testing and if patient can receive vaccine  Left voicemail that unfortunately there is no way to tell what parts of the vaccine she is allergic to  Can perform skin test by dropping a bit of the flu vaccine on skin and use a scratch test  Left voicemail for patient to please return office call to ensure that exceptions for appointment are verified 4 (moderate pain)